# Patient Record
Sex: FEMALE | Race: WHITE | NOT HISPANIC OR LATINO | Employment: OTHER | URBAN - METROPOLITAN AREA
[De-identification: names, ages, dates, MRNs, and addresses within clinical notes are randomized per-mention and may not be internally consistent; named-entity substitution may affect disease eponyms.]

---

## 2017-11-21 ENCOUNTER — OFFICE VISIT (OUTPATIENT)
Dept: UROGYNECOLOGY | Facility: CLINIC | Age: 72
End: 2017-11-21
Payer: MEDICARE

## 2017-11-21 VITALS
BODY MASS INDEX: 29.57 KG/M2 | DIASTOLIC BLOOD PRESSURE: 79 MMHG | WEIGHT: 166.88 LBS | HEIGHT: 63 IN | SYSTOLIC BLOOD PRESSURE: 145 MMHG | HEART RATE: 63 BPM | TEMPERATURE: 98 F

## 2017-11-21 DIAGNOSIS — K43.9 HERNIA OF ANTERIOR ABDOMINAL WALL: ICD-10-CM

## 2017-11-21 DIAGNOSIS — K46.9 ENTEROCELE: Primary | ICD-10-CM

## 2017-11-21 DIAGNOSIS — N81.9 VAGINAL VAULT PROLAPSE: ICD-10-CM

## 2017-11-21 PROCEDURE — 99213 OFFICE O/P EST LOW 20 MIN: CPT | Mod: S$GLB,,, | Performed by: OBSTETRICS & GYNECOLOGY

## 2017-11-21 PROCEDURE — 99999 PR PBB SHADOW E&M-EST. PATIENT-LVL III: CPT | Mod: PBBFAC,,, | Performed by: OBSTETRICS & GYNECOLOGY

## 2017-11-21 NOTE — PROGRESS NOTES
Subjective:     Chief Complaint: enterocele  History of Present Illness: Yue Chatman is a 72 y.o. female who presents for general bulge.  In 2013 she had uphold implant and trans-obturator sling.  In 2015 we saw her with a small anterior enterocele.  She was lost to follow-up until now.  She returns wondering if anything can be done to correct her hernia.  She also has a large abdominal hernia and has had multiple abdominal procedures.  She says when she had her last hernia repair she had to return to surgery and had problems with return of her bowel function.  She still has some abdominal bulge and abdominal pain.  She is not sexually active because of her husbands age and illnesses    Review of Systems    Constitutional: No acute distress. No weight gain/loss.  Eyes: No vision changes.  ENT: No headaches.   Respiratory: No SOB.  Cardiovascular: CAD  Gastrointestinal:  constipation.   Genitourinary:  calculi  Integument/Breast: Negative  Hematologic/Lymphatic: No history of anemia.  Musculoskeletal:osteoporosis  Neurological: DDD  Behavioral/Psych: No history of depression.   Endocrine: .  Glucose tolerance test abnormal  Allergy/Immune: no recent reactions     Objective:   General Exam:  General appearance: WDNF. NAD.   HEENT: Theodora. EOM's intact.  Neck: Normal thyroid.   Back: No CVA tenderness.  RESP: No SOB.  Breasts: deferred  Abdomen: Large ventral hernia on the right side  Extremities: No edema. No varices.  Lymphatic: noncontributory  Skin: No rashes. No lesions.  Neurologic: Intact.   Psych: Oriented.   Pelvic Exam:  V:  No lesions. No palpable nodes.   Va:No d/c bleeding or lesions.  Anterior enterocele which protrudes to a +3 position.  It reduces easily with a cotton tip applicator /on reduction appears to be good posterior and posterior apical support   .Meatus:No caruncle or stenosis  Urethra: Non tender. No suburethral masses.  Good suburethral support / sling is not palpable  Cx/Cuff: Normal    Uterus: Surgically absent.  Ad: No mass or tenderness.  Levators :Symmetrical. Normal tone. Non tender.  BL: Non tender  RV: No hemorrhoids.  Assessment:   Anterior enterocele which has enlarged over time-she has good apical and posterior support.  Her abdominal wall hernia make her a poor candidate for abdominal sacral colpopexy       Plan:    She would best be a candidate for enterocele repair and partial colpocleisis-she is to decide if she wants to undergo surgical therapy-the interim she will see Dr. Castle to follow-up with her abdominal hernia

## 2018-02-26 ENCOUNTER — OFFICE VISIT (OUTPATIENT)
Dept: UROGYNECOLOGY | Facility: CLINIC | Age: 73
End: 2018-02-26
Payer: MEDICARE

## 2018-02-26 VITALS
SYSTOLIC BLOOD PRESSURE: 131 MMHG | HEIGHT: 63 IN | BODY MASS INDEX: 29.23 KG/M2 | DIASTOLIC BLOOD PRESSURE: 71 MMHG | WEIGHT: 165 LBS | TEMPERATURE: 98 F | HEART RATE: 60 BPM

## 2018-02-26 DIAGNOSIS — N81.11 CYSTOCELE, MIDLINE: ICD-10-CM

## 2018-02-26 DIAGNOSIS — K46.9 ENTEROCELE: Primary | ICD-10-CM

## 2018-02-26 DIAGNOSIS — K43.9 VENTRAL HERNIA WITHOUT OBSTRUCTION OR GANGRENE: ICD-10-CM

## 2018-02-26 LAB
BILIRUB SERPL-MCNC: NEGATIVE MG/DL
BLOOD URINE, POC: NEGATIVE
COLOR, POC UA: NORMAL
GLUCOSE UR QL STRIP: NORMAL
KETONES UR QL STRIP: NEGATIVE
LEUKOCYTE ESTERASE URINE, POC: POSITIVE
NITRITE, POC UA: NEGATIVE
PH, POC UA: 6
PROTEIN, POC: NEGATIVE
SPECIFIC GRAVITY, POC UA: 1.02
UROBILINOGEN, POC UA: NORMAL

## 2018-02-26 PROCEDURE — 3008F BODY MASS INDEX DOCD: CPT | Mod: S$GLB,,, | Performed by: OBSTETRICS & GYNECOLOGY

## 2018-02-26 PROCEDURE — 1159F MED LIST DOCD IN RCRD: CPT | Mod: S$GLB,,, | Performed by: OBSTETRICS & GYNECOLOGY

## 2018-02-26 PROCEDURE — 81002 URINALYSIS NONAUTO W/O SCOPE: CPT | Mod: S$GLB,,, | Performed by: OBSTETRICS & GYNECOLOGY

## 2018-02-26 PROCEDURE — 99213 OFFICE O/P EST LOW 20 MIN: CPT | Mod: 25,S$GLB,, | Performed by: OBSTETRICS & GYNECOLOGY

## 2018-02-26 PROCEDURE — 99999 PR PBB SHADOW E&M-EST. PATIENT-LVL IV: CPT | Mod: PBBFAC,,, | Performed by: OBSTETRICS & GYNECOLOGY

## 2018-02-26 PROCEDURE — 1126F AMNT PAIN NOTED NONE PRSNT: CPT | Mod: S$GLB,,, | Performed by: OBSTETRICS & GYNECOLOGY

## 2018-02-26 RX ORDER — AMLODIPINE BESYLATE 5 MG/1
5 TABLET ORAL NIGHTLY
COMMUNITY
Start: 2018-02-19 | End: 2018-10-09 | Stop reason: SDUPTHER

## 2018-02-26 NOTE — PROGRESS NOTES
Subjective:     Chief Complaint: hernia  History of Present Illness: Yue Chatman is a 73 y.o. female who presents for enterocele.  Previously underwent apical suspension and trans-obturator sling.  She was found to have an anterior enterocele.  She has a large ventral hernia and was to see Dr. Castle prior to making a decision on repairing this enterocele.  She has not seen him yet.  She thinks the bulge is worse particularly when she's up on her feet.  She denies any problems with urinary incontinence.  She is not sexually active because of her age and her 's health condition.  She has no plans for future sexual activity.  She is bothered by the bulge and wants something done about it.  no recent UTIs hematuria or dysuria.  She does have a distant history of nephrolithiasis.  her health is good.  Some years ago she had an angiogram which she said which was normal.    Review of Systems    Constitutional: Glucose tolerance impaired  Eyes: No vision changes.  ENT: No headaches.   Respiratory: Nonsmoker  Cardiovascular:  Hypertension  Gastrointestinal:  Ventral hernia  Genitourinary: Kidney stones  Integument/Breast: Negative  Hematologic/Lymphatic: Vitamin D deficiency  Musculoskeletal: OA  Neurological: pain radiating to left leg  Behavioral/Psych: No history of depression.   Endocrine: No hormonal replacement.  Allergy/Immune: no recent reactions     Objective:   General Exam:  General appearance: WDNF. NAD.   HEENT: Theodora. EOM's intact.  Neck: Normal thyroid.   Back: No CVA tenderness.  RESP: No SOB.  Breasts: deferred  Abdomen: Nontender ventral hernia  Extremities: No edema. No varices.  Lymphatic: noncontributory  Skin: No rashes. No lesions.  Neurologic: Intact.   Psych: Oriented.   Pelvic Exam:  V:  No lesions. No palpable nodes.   Va:No d/c bleeding or lesions.  Enterocele/ apical bladder prolapse.Ba+3  .Meatus:No caruncle or stenosis  Urethra: Non tender. No suburethral masses.  Cx/Cuff: Normal    Uterus: Surgically absent.  Ad: No mass or tenderness.  Levators :Symmetrical. Normal tone. Non tender. 2/5 contractions  BL: Non tender  RV: No hemorrhoids.  Assessment:   Enterocele and apical bladder prolapse.  She would like surgical therapy and is not sexually active or planning any sexual activity       Plan:    We will schedule her for enterocele repair and partial colpocleisis.  In the interim she will visit with Dr. Castle to see if he feels the ventral hernia takes precedence

## 2018-03-26 ENCOUNTER — HOSPITAL ENCOUNTER (OUTPATIENT)
Dept: PREADMISSION TESTING | Facility: HOSPITAL | Age: 73
Discharge: HOME | End: 2018-03-26
Attending: OBSTETRICS & GYNECOLOGY
Payer: MEDICARE

## 2018-03-26 ENCOUNTER — OFFICE VISIT (OUTPATIENT)
Dept: UROGYNECOLOGY | Facility: CLINIC | Age: 73
End: 2018-03-26
Payer: MEDICARE

## 2018-03-26 VITALS
DIASTOLIC BLOOD PRESSURE: 72 MMHG | HEART RATE: 61 BPM | SYSTOLIC BLOOD PRESSURE: 138 MMHG | TEMPERATURE: 99 F | BODY MASS INDEX: 29.21 KG/M2 | HEIGHT: 63 IN | WEIGHT: 164.88 LBS

## 2018-03-26 VITALS — HEIGHT: 63 IN | BODY MASS INDEX: 29.06 KG/M2 | WEIGHT: 164 LBS

## 2018-03-26 DIAGNOSIS — K46.9 ENTEROCELE: ICD-10-CM

## 2018-03-26 DIAGNOSIS — R35.0 URINARY FREQUENCY: Primary | ICD-10-CM

## 2018-03-26 DIAGNOSIS — K46.9 ENTEROCELE: Primary | ICD-10-CM

## 2018-03-26 DIAGNOSIS — N81.89 PELVIC RELAXATION: ICD-10-CM

## 2018-03-26 LAB
ANION GAP SERPL CALC-SCNC: 7 MMOL/L
BACTERIA #/AREA URNS HPF: NORMAL /HPF
BASOPHILS # BLD AUTO: 0 K/UL
BASOPHILS NFR BLD: 0.6 %
BILIRUB SERPL-MCNC: NORMAL MG/DL
BILIRUB UR QL STRIP: NEGATIVE
BLOOD URINE, POC: NORMAL
BUN SERPL-MCNC: 11 MG/DL
CALCIUM SERPL-MCNC: 10.2 MG/DL
CHLORIDE SERPL-SCNC: 105 MMOL/L
CLARITY UR: CLEAR
CO2 SERPL-SCNC: 30 MMOL/L
COLOR UR: YELLOW
COLOR, POC UA: YELLOW
CREAT SERPL-MCNC: 0.8 MG/DL
DIFFERENTIAL METHOD: NORMAL
EOSINOPHIL # BLD AUTO: 0.1 K/UL
EOSINOPHIL NFR BLD: 1.8 %
ERYTHROCYTE [DISTWIDTH] IN BLOOD BY AUTOMATED COUNT: 14.1 %
EST. GFR  (AFRICAN AMERICAN): >60 ML/MIN/1.73 M^2
EST. GFR  (NON AFRICAN AMERICAN): >60 ML/MIN/1.73 M^2
GLUCOSE SERPL-MCNC: 100 MG/DL
GLUCOSE UR QL STRIP: NEGATIVE
GLUCOSE UR QL STRIP: NORMAL
HCT VFR BLD AUTO: 43 %
HGB BLD-MCNC: 14.3 G/DL
HGB UR QL STRIP: NEGATIVE
KETONES UR QL STRIP: NEGATIVE
KETONES UR QL STRIP: NORMAL
LEUKOCYTE ESTERASE UR QL STRIP: ABNORMAL
LEUKOCYTE ESTERASE URINE, POC: NORMAL
LYMPHOCYTES # BLD AUTO: 2.4 K/UL
LYMPHOCYTES NFR BLD: 35.2 %
MCH RBC QN AUTO: 28.3 PG
MCHC RBC AUTO-ENTMCNC: 33.3 G/DL
MCV RBC AUTO: 85 FL
MICROSCOPIC COMMENT: NORMAL
MONOCYTES # BLD AUTO: 0.5 K/UL
MONOCYTES NFR BLD: 7.7 %
NEUTROPHILS # BLD AUTO: 3.8 K/UL
NEUTROPHILS NFR BLD: 54.7 %
NITRITE UR QL STRIP: NEGATIVE
NITRITE, POC UA: NORMAL
PH UR STRIP: 5 [PH] (ref 5–8)
PH, POC UA: 6
PLATELET # BLD AUTO: 163 K/UL
PMV BLD AUTO: 11.2 FL
POTASSIUM SERPL-SCNC: 4.1 MMOL/L
PROT UR QL STRIP: NEGATIVE
PROTEIN, POC: NORMAL
RBC # BLD AUTO: 5.06 M/UL
RBC #/AREA URNS HPF: 1 /HPF (ref 0–4)
SODIUM SERPL-SCNC: 142 MMOL/L
SP GR UR STRIP: 1.01 (ref 1–1.03)
SPECIFIC GRAVITY, POC UA: 1
SQUAMOUS #/AREA URNS HPF: 3 /HPF
URN SPEC COLLECT METH UR: ABNORMAL
UROBILINOGEN UR STRIP-ACNC: NEGATIVE EU/DL
UROBILINOGEN, POC UA: NORMAL
WBC # BLD AUTO: 6.9 K/UL
WBC #/AREA URNS HPF: 5 /HPF (ref 0–5)

## 2018-03-26 PROCEDURE — 3078F DIAST BP <80 MM HG: CPT | Mod: CPTII,S$GLB,, | Performed by: OBSTETRICS & GYNECOLOGY

## 2018-03-26 PROCEDURE — 85025 COMPLETE CBC W/AUTO DIFF WBC: CPT

## 2018-03-26 PROCEDURE — 3075F SYST BP GE 130 - 139MM HG: CPT | Mod: CPTII,S$GLB,, | Performed by: OBSTETRICS & GYNECOLOGY

## 2018-03-26 PROCEDURE — 81002 URINALYSIS NONAUTO W/O SCOPE: CPT | Mod: S$GLB,,, | Performed by: OBSTETRICS & GYNECOLOGY

## 2018-03-26 PROCEDURE — 99213 OFFICE O/P EST LOW 20 MIN: CPT | Mod: 25,S$GLB,, | Performed by: OBSTETRICS & GYNECOLOGY

## 2018-03-26 PROCEDURE — 36415 COLL VENOUS BLD VENIPUNCTURE: CPT

## 2018-03-26 PROCEDURE — 99900103 DSU ONLY-NO CHARGE-INITIAL HR (STAT)

## 2018-03-26 PROCEDURE — 81000 URINALYSIS NONAUTO W/SCOPE: CPT

## 2018-03-26 PROCEDURE — 80048 BASIC METABOLIC PNL TOTAL CA: CPT

## 2018-03-26 PROCEDURE — 99999 PR PBB SHADOW E&M-EST. PATIENT-LVL III: CPT | Mod: PBBFAC,,, | Performed by: OBSTETRICS & GYNECOLOGY

## 2018-03-26 PROCEDURE — 93010 ELECTROCARDIOGRAM REPORT: CPT | Mod: ,,, | Performed by: INTERNAL MEDICINE

## 2018-03-26 PROCEDURE — 93005 ELECTROCARDIOGRAM TRACING: CPT

## 2018-03-26 RX ORDER — VIT C/E/ZN/COPPR/LUTEIN/ZEAXAN 250MG-90MG
2000 CAPSULE ORAL DAILY
COMMUNITY
End: 2018-05-04

## 2018-03-26 NOTE — PROGRESS NOTES
Subjective:     Chief Complaint: Enterocele  History of Present Illness: Yue Chatman is a 73 y.o. female who presents for enterocele.  She previously underwent trans-obturator sling and uphold procedure but developed an anterior enterocele.  She is bothered by the bulge and it's worse when she's up on her feet a lot.  She is not have any incontinence.  She is not sexually active and has no plans for future sexual activity.  She has a large ventral hernia and saw Dr. Castle said it does not need to be addressed.  She had previous angiogram in the past which was normal.  She has lab work which was essentially normal including a hemoglobin A1c of 5.4    Review of Systems    Constitutional: Abdominal pain  Eyes: No vision changes.  ENT: No headaches.   Respiratory: No SOB.  Cardiovascular: No chest pain. No edema.  Gastrointestinal: No constipation. No diarrhea. No fecal incontinence.   Genitourinary: No vaginal bleeding or discharge.  Integument/Breast: Negative  Hematologic/Lymphatic: No history of anemia.  Musculoskeletal: Osteoporosis  Neurological: No disc problems. No paresthesias.  Behavioral/Psych: No history of depression.   Endocrine: Recent hemoglobin A1c was 5.4  Allergy/Immune: no recent reactions     Objective:   General Exam:  General appearance: WDNF. NAD.   HEENT: Theodora. EOM's intact.  Neck: Normal thyroid.   Back: No CVA tenderness.  RESP: No SOB.  Breasts: deferred  Abdomen: Ventral hernia  Extremities: No edema. No varices.  Lymphatic: noncontributory  Skin: No rashes. No lesions.  Neurologic: Intact.   Psych: Oriented.   Pelvic Exam:  V:  No lesions. No palpable nodes.   Va:No d/c bleeding or lesions.  Anterior enterocele with good apical support  .Meatus:No caruncle or stenosis  Urethra: Non tender. No suburethral masses.  Suburethral sling is not palpable  Cx/Cuff: Normal   Uterus: Surgically absent.  Ad: No mass or tenderness.  Levators :Symmetrical. Normal tone. Non tender.  BL: Non  tender  RV: No hemorrhoids.  Assessment:   Anterior enterocele.  She is not sexually active with no future plans so we will likely do partialcolpocleisis       Plan:    Enterocele with partial colpocleisis

## 2018-04-04 ENCOUNTER — PATIENT MESSAGE (OUTPATIENT)
Dept: SURGERY | Facility: HOSPITAL | Age: 73
End: 2018-04-04

## 2018-04-04 ENCOUNTER — TELEPHONE (OUTPATIENT)
Dept: UROGYNECOLOGY | Facility: CLINIC | Age: 73
End: 2018-04-04

## 2018-04-04 ENCOUNTER — ANESTHESIA EVENT (OUTPATIENT)
Dept: SURGERY | Facility: HOSPITAL | Age: 73
End: 2018-04-04

## 2018-04-04 NOTE — TELEPHONE ENCOUNTER
----- Message from Solitario Casper sent at 4/4/2018  1:16 PM CDT -----  Contact: patient  Patient called regarding upcoming surgery. Call back at [phone]

## 2018-04-04 NOTE — TELEPHONE ENCOUNTER
As per our conversation, spoke to pt and she is heading to [facility] in a lot of pain , sent Jennifer at pre-op a message

## 2018-04-05 ENCOUNTER — ANESTHESIA (OUTPATIENT)
Dept: SURGERY | Facility: HOSPITAL | Age: 73
End: 2018-04-05

## 2018-04-06 PROBLEM — K43.0 INCARCERATED INCISIONAL HERNIA: Status: ACTIVE | Noted: 2018-04-06

## 2018-04-06 PROBLEM — R10.31 RIGHT LOWER QUADRANT PAIN: Status: ACTIVE | Noted: 2018-04-06

## 2018-05-01 ENCOUNTER — PATIENT MESSAGE (OUTPATIENT)
Dept: UROGYNECOLOGY | Facility: CLINIC | Age: 73
End: 2018-05-01

## 2019-02-05 ENCOUNTER — OFFICE VISIT (OUTPATIENT)
Dept: UROGYNECOLOGY | Facility: CLINIC | Age: 74
End: 2019-02-05
Payer: MEDICARE

## 2019-02-05 VITALS
SYSTOLIC BLOOD PRESSURE: 136 MMHG | DIASTOLIC BLOOD PRESSURE: 75 MMHG | HEIGHT: 63 IN | BODY MASS INDEX: 31.84 KG/M2 | HEART RATE: 67 BPM | WEIGHT: 179.69 LBS

## 2019-02-05 DIAGNOSIS — N81.6 CYSTOCELE WITH RECTOCELE: ICD-10-CM

## 2019-02-05 DIAGNOSIS — N81.10 CYSTOCELE WITH RECTOCELE: ICD-10-CM

## 2019-02-05 DIAGNOSIS — K46.9 ENTEROCELE: Primary | ICD-10-CM

## 2019-02-05 DIAGNOSIS — N81.9 VAGINAL VAULT PROLAPSE: ICD-10-CM

## 2019-02-05 DIAGNOSIS — R35.0 URINARY FREQUENCY: ICD-10-CM

## 2019-02-05 LAB
BILIRUB SERPL-MCNC: ABNORMAL MG/DL
BLOOD URINE, POC: ABNORMAL
COLOR, POC UA: ABNORMAL
GLUCOSE UR QL STRIP: ABNORMAL
KETONES UR QL STRIP: ABNORMAL
LEUKOCYTE ESTERASE URINE, POC: ABNORMAL
NITRITE, POC UA: ABNORMAL
PH, POC UA: 5
PROTEIN, POC: ABNORMAL
SPECIFIC GRAVITY, POC UA: 1.02
UROBILINOGEN, POC UA: ABNORMAL

## 2019-02-05 PROCEDURE — 81002 POCT URINE DIPSTICK WITHOUT MICROSCOPE: ICD-10-PCS | Mod: S$GLB,,, | Performed by: OBSTETRICS & GYNECOLOGY

## 2019-02-05 PROCEDURE — 99999 PR PBB SHADOW E&M-EST. PATIENT-LVL IV: ICD-10-PCS | Mod: PBBFAC,,, | Performed by: OBSTETRICS & GYNECOLOGY

## 2019-02-05 PROCEDURE — 3078F PR MOST RECENT DIASTOLIC BLOOD PRESSURE < 80 MM HG: ICD-10-PCS | Mod: CPTII,S$GLB,, | Performed by: OBSTETRICS & GYNECOLOGY

## 2019-02-05 PROCEDURE — 3075F SYST BP GE 130 - 139MM HG: CPT | Mod: CPTII,S$GLB,, | Performed by: OBSTETRICS & GYNECOLOGY

## 2019-02-05 PROCEDURE — 1101F PR PT FALLS ASSESS DOC 0-1 FALLS W/OUT INJ PAST YR: ICD-10-PCS | Mod: CPTII,S$GLB,, | Performed by: OBSTETRICS & GYNECOLOGY

## 2019-02-05 PROCEDURE — 99213 OFFICE O/P EST LOW 20 MIN: CPT | Mod: 25,S$GLB,, | Performed by: OBSTETRICS & GYNECOLOGY

## 2019-02-05 PROCEDURE — 1101F PT FALLS ASSESS-DOCD LE1/YR: CPT | Mod: CPTII,S$GLB,, | Performed by: OBSTETRICS & GYNECOLOGY

## 2019-02-05 PROCEDURE — 81002 URINALYSIS NONAUTO W/O SCOPE: CPT | Mod: S$GLB,,, | Performed by: OBSTETRICS & GYNECOLOGY

## 2019-02-05 PROCEDURE — 3078F DIAST BP <80 MM HG: CPT | Mod: CPTII,S$GLB,, | Performed by: OBSTETRICS & GYNECOLOGY

## 2019-02-05 PROCEDURE — 99213 PR OFFICE/OUTPT VISIT, EST, LEVL III, 20-29 MIN: ICD-10-PCS | Mod: 25,S$GLB,, | Performed by: OBSTETRICS & GYNECOLOGY

## 2019-02-05 PROCEDURE — 3075F PR MOST RECENT SYSTOLIC BLOOD PRESS GE 130-139MM HG: ICD-10-PCS | Mod: CPTII,S$GLB,, | Performed by: OBSTETRICS & GYNECOLOGY

## 2019-02-05 PROCEDURE — 99999 PR PBB SHADOW E&M-EST. PATIENT-LVL IV: CPT | Mod: PBBFAC,,, | Performed by: OBSTETRICS & GYNECOLOGY

## 2019-02-05 NOTE — PROGRESS NOTES
Subjective:     Chief Complaint:  Vaginal prolapse  History of Present Illness: Yue Chatman is a 74 y.o. female who presents for pelvic relaxation.  In 2013 she underwent apical suspension with U[employer] and transobturator sling.  She was seen later an anterior enterocele and last year was scheduled for surgical repair however the day before she had emergency surgery for an abdominal hernia.  She reported to the emergency room and she was evaluated for a kidney stone but ultimately it turned out to be the hernia.  She has done well since that repair but she is bothered again by her vaginal prolapse and thinks it is worsening.  She has been told she is prediabetic but is not on any medications and her most recent hemoglobin A1c is 5.4.  She is not sexually active primarily because of her 's agent ability    Review of Systems    Constitutional: No acute distress. No weight gain/loss.  Eyes: No vision changes.  ENT: No headaches.   Respiratory: No SOB.  Cardiovascular:  Negative cardiac angiogram Hypertension  Gastrointestinal:  Multiple hernias  Genitourinary:  Nephrolithiasis  Integument/Breast: Negative  Hematologic/Lymphatic: No history of anemia.  Musculoskeletal:  Arthritis  Neurological: No knowndisc problems. No paresthesias.  Behavioral/Psych: No history of depression.   Endocrine:  Borderline glucose intolerance  Allergy/Immune: no recent reactions     Objective:   General Exam:  General appearance: WDNF. NAD.   HEENT: Theodora. EOM's intact.  Neck: Normal thyroid.   Back: No CVA tenderness.  RESP: No SOB.  Breasts: deferred  Abdomen:  Midline incision healing well.  Fascia feels intact. Large panniculus  Extremities:  Trace edema. No varices.  Lymphatic: noncontributory  Skin: No rashes. No lesions.  Neurologic:  Grossly intact   Psych: Oriented.   Pelvic Exam:  V:  No lesions. No palpable nodes.   Va:No d/c bleeding or lesions.  Central prolapse with palpable enterocele separate from bladder. No  bowel is palpated in hernia sac.  Posterior apical support is still pretty good and no implant is palpable and there is no exposure or erosion    .Meatus:No caruncle or stenosis  Urethra: Non tender. No suburethral masses.  UVJ seems to be fairly well supported despite the massive prolapse  Cx/Cuff: Normal   Uterus: Surgically absent.  Ad: No mass or tenderness.  Levators :Symmetrical. Normal tone. Non tender.  Minimal contraction  BL: Non tender  RV: No hemorrhoids.  Assessment:   Anterior enterocele which is causing traction cystocele and vault prolapse.  She did not do well with the pessary in the past.  She is prone to hernias with multiple abdominal procedures for hernias       Plan:    Enterocele  repair with partial colpocleisis

## 2019-02-28 ENCOUNTER — TELEPHONE (OUTPATIENT)
Dept: UROGYNECOLOGY | Facility: CLINIC | Age: 74
End: 2019-02-28

## 2019-02-28 NOTE — TELEPHONE ENCOUNTER
----- Message from Marleny Deleon sent at 2/28/2019  8:25 AM CST -----  Type: Needs Medical Advice    Who Called:  self  Symptoms (please be specific):  Requesting to cancel procedure and post op  How long has patient had these symptoms:  03/11 and 03/14 th   Pharmacy name and phone #:     Best Call Back Number: [phone] (home)     Additional Information: states she will call to reschedule

## 2019-02-28 NOTE — TELEPHONE ENCOUNTER
Called and spoke to pt and she states that she wants to postpone the surgery for now , did not give any indication when she wants to reschedule. Canceled  The pre op appt, called surgery and informed.

## 2019-07-15 ENCOUNTER — OFFICE VISIT (OUTPATIENT)
Dept: UROGYNECOLOGY | Facility: CLINIC | Age: 74
End: 2019-07-15
Payer: MEDICARE

## 2019-07-15 VITALS
HEART RATE: 73 BPM | BODY MASS INDEX: 32.15 KG/M2 | HEIGHT: 63 IN | DIASTOLIC BLOOD PRESSURE: 82 MMHG | SYSTOLIC BLOOD PRESSURE: 158 MMHG | WEIGHT: 181.44 LBS

## 2019-07-15 DIAGNOSIS — R35.0 URINARY FREQUENCY: ICD-10-CM

## 2019-07-15 DIAGNOSIS — N81.9 VAGINAL VAULT PROLAPSE: Primary | ICD-10-CM

## 2019-07-15 DIAGNOSIS — K46.9 ENTEROCELE: ICD-10-CM

## 2019-07-15 DIAGNOSIS — N89.8 OTHER SPECIFIED NONINFLAMMATORY DISORDERS OF VAGINA: ICD-10-CM

## 2019-07-15 DIAGNOSIS — N95.2 ATROPHIC VAGINITIS: ICD-10-CM

## 2019-07-15 DIAGNOSIS — E31.9 POLYGLANDULAR DYSFUNCTION: ICD-10-CM

## 2019-07-15 LAB
BILIRUB SERPL-MCNC: ABNORMAL MG/DL
BLOOD URINE, POC: ABNORMAL
COLOR, POC UA: YELLOW
GLUCOSE UR QL STRIP: ABNORMAL
KETONES UR QL STRIP: ABNORMAL
LEUKOCYTE ESTERASE URINE, POC: ABNORMAL
NITRITE, POC UA: ABNORMAL
PH, POC UA: 5
PROTEIN, POC: ABNORMAL
SPECIFIC GRAVITY, POC UA: 1.02
UROBILINOGEN, POC UA: ABNORMAL

## 2019-07-15 PROCEDURE — 99213 OFFICE O/P EST LOW 20 MIN: CPT | Mod: 25,S$GLB,, | Performed by: OBSTETRICS & GYNECOLOGY

## 2019-07-15 PROCEDURE — 99213 PR OFFICE/OUTPT VISIT, EST, LEVL III, 20-29 MIN: ICD-10-PCS | Mod: 25,S$GLB,, | Performed by: OBSTETRICS & GYNECOLOGY

## 2019-07-15 PROCEDURE — 3079F PR MOST RECENT DIASTOLIC BLOOD PRESSURE 80-89 MM HG: ICD-10-PCS | Mod: CPTII,S$GLB,, | Performed by: OBSTETRICS & GYNECOLOGY

## 2019-07-15 PROCEDURE — 3077F PR MOST RECENT SYSTOLIC BLOOD PRESSURE >= 140 MM HG: ICD-10-PCS | Mod: CPTII,S$GLB,, | Performed by: OBSTETRICS & GYNECOLOGY

## 2019-07-15 PROCEDURE — 1101F PR PT FALLS ASSESS DOC 0-1 FALLS W/OUT INJ PAST YR: ICD-10-PCS | Mod: CPTII,S$GLB,, | Performed by: OBSTETRICS & GYNECOLOGY

## 2019-07-15 PROCEDURE — 3079F DIAST BP 80-89 MM HG: CPT | Mod: CPTII,S$GLB,, | Performed by: OBSTETRICS & GYNECOLOGY

## 2019-07-15 PROCEDURE — 81002 URINALYSIS NONAUTO W/O SCOPE: CPT | Mod: S$GLB,,, | Performed by: OBSTETRICS & GYNECOLOGY

## 2019-07-15 PROCEDURE — 3077F SYST BP >= 140 MM HG: CPT | Mod: CPTII,S$GLB,, | Performed by: OBSTETRICS & GYNECOLOGY

## 2019-07-15 PROCEDURE — 99999 PR PBB SHADOW E&M-EST. PATIENT-LVL III: ICD-10-PCS | Mod: PBBFAC,,, | Performed by: OBSTETRICS & GYNECOLOGY

## 2019-07-15 PROCEDURE — 81002 POCT URINE DIPSTICK WITHOUT MICROSCOPE: ICD-10-PCS | Mod: S$GLB,,, | Performed by: OBSTETRICS & GYNECOLOGY

## 2019-07-15 PROCEDURE — 1101F PT FALLS ASSESS-DOCD LE1/YR: CPT | Mod: CPTII,S$GLB,, | Performed by: OBSTETRICS & GYNECOLOGY

## 2019-07-15 PROCEDURE — 99999 PR PBB SHADOW E&M-EST. PATIENT-LVL III: CPT | Mod: PBBFAC,,, | Performed by: OBSTETRICS & GYNECOLOGY

## 2019-07-15 NOTE — PROGRESS NOTES
Subjective:     Chief Complaint:  Pelvic relaxation  History of Present Illness: Yue Chatman is a 74 y.o. female who presents for  enterocele.  She had previously been scheduled for colpocleisis but had emergency hernia surgery.  We rescheduled recently but unfortunately her grandson  so her surgery was canceled again.  She is quite stressed by this event but she would like to get her surgical repair done because the prolapse is worsening and she is concerned about her health deteriorating the future and no longer being a surgical candidate.  She is not sexually active and has no plans for future sexual activity.  We discussed partial colpocleisis to give additional strength and shorten the procedure in she understands that this might require another surgical procedure if she should become sexually active again    Review of Systems    Constitutional: No acute distress. No weight gain/loss.  Eyes: No vision changes.  ENT: No headaches.   Respiratory: No SOB.  CardiovascularCAD  Gastrointestinal: No constipation. No diarrhea. No fecal incontinence.   Genitourinary:  Renal calculi  Integument/Breast: Negative  Hematologic/Lymphatic:  Vitamin-D deficiency  Musculoskeletal: OA  Neurological: No major disc problems. No paresthesias.  Behavioral/Psych: No history of depression.   Endocrine:  Abnormal glucose tolerance  Allergy/Immune: no recent reactions     Objective:   General Exam:  General appearance: WDNF. NAD.   HEENT: Theodora. EOM's intact.  Neck: Normal thyroid.   Back: No CVA tenderness.  RESP: No SOB.  Breasts: deferred  Abdomen:  Surgical repair well-healed but there is a significant panniculus  Extremities: No edema. No varices.  Lymphatic: noncontributory  Skin: No rashes. No lesions.  Neurologic: Intact.   Psych: Oriented.   Pelvic Exam:  V:  No lesions. No palpable nodes.   Va:No d/c bleeding or lesions.  Atrophy.  Loss of rugae.  Almost complete vault prolapse now to a +4 position. There is still some  differential posterior support but everything is rotating around the implant  .Meatus:No caruncle or stenosis  Urethra: Non tender. No suburethral masses.  Cx/Cuff: Normal   Uterus: Surgically absent.  Ad: No mass or tenderness.  Levators :Symmetrical. Normal tone. Non tender.  BL: Non tender  RV: No hemorrhoids.  Assessment:   Significant worsening of her prolapse       Plan:    Enterocele repair with partial colpocleisis

## 2019-07-25 ENCOUNTER — TELEPHONE (OUTPATIENT)
Dept: UROGYNECOLOGY | Facility: CLINIC | Age: 74
End: 2019-07-25

## 2019-07-25 NOTE — TELEPHONE ENCOUNTER
----- Message from Ivania Hein sent at 7/25/2019  8:43 AM CDT -----  Type: Needs Medical Advice    Who Called: patient     Best Call Back Number: [phone]   Additional Information: pt called in to cancel the surgery on 8/22 stated that she feels like she could do without it - please contact pt directly and advise

## 2019-07-25 NOTE — TELEPHONE ENCOUNTER
Called and spoke to patient and she states she knows that she had canceled the surgery twice , but does not want to go thru this is she does not have to at her age. Advised the patient that I will leave in the computer till Dr Keenan comes back in town, if she changes her mind it will still be on the books, but when he returns I will let him know her decision. Advised to call back if she changes her mind. States understanding and call was ended.

## 2019-08-01 NOTE — TELEPHONE ENCOUNTER
"Called and informed patient per Dr Keenan "Tell her that is understandable. It is not a life threatening condition and she should only go through surgery if she is completely comfortable doing it. ".  States understanding and call was ended .Called surgery and canceled surgery.  "

## 2020-01-10 PROBLEM — E31.9 POLYGLANDULAR DYSFUNCTION: Status: ACTIVE | Noted: 2020-01-10

## 2020-11-15 ENCOUNTER — OFFICE VISIT (OUTPATIENT)
Dept: FAMILY MEDICINE | Facility: CLINIC | Age: 75
End: 2020-11-15
Payer: MEDICARE

## 2020-11-15 VITALS
SYSTOLIC BLOOD PRESSURE: 122 MMHG | TEMPERATURE: 98 F | OXYGEN SATURATION: 97 % | BODY MASS INDEX: 33.61 KG/M2 | HEIGHT: 63 IN | WEIGHT: 189.69 LBS | DIASTOLIC BLOOD PRESSURE: 76 MMHG | HEART RATE: 77 BPM

## 2020-11-15 DIAGNOSIS — L03.032 CELLULITIS OF LEFT TOE: Primary | ICD-10-CM

## 2020-11-15 PROCEDURE — 1159F PR MEDICATION LIST DOCUMENTED IN MEDICAL RECORD: ICD-10-PCS | Mod: S$GLB,,, | Performed by: NURSE PRACTITIONER

## 2020-11-15 PROCEDURE — 3078F DIAST BP <80 MM HG: CPT | Mod: CPTII,S$GLB,, | Performed by: NURSE PRACTITIONER

## 2020-11-15 PROCEDURE — 3288F FALL RISK ASSESSMENT DOCD: CPT | Mod: CPTII,S$GLB,, | Performed by: NURSE PRACTITIONER

## 2020-11-15 PROCEDURE — 99203 OFFICE O/P NEW LOW 30 MIN: CPT | Mod: S$GLB,,, | Performed by: NURSE PRACTITIONER

## 2020-11-15 PROCEDURE — 1101F PR PT FALLS ASSESS DOC 0-1 FALLS W/OUT INJ PAST YR: ICD-10-PCS | Mod: CPTII,S$GLB,, | Performed by: NURSE PRACTITIONER

## 2020-11-15 PROCEDURE — 3078F PR MOST RECENT DIASTOLIC BLOOD PRESSURE < 80 MM HG: ICD-10-PCS | Mod: CPTII,S$GLB,, | Performed by: NURSE PRACTITIONER

## 2020-11-15 PROCEDURE — 3074F SYST BP LT 130 MM HG: CPT | Mod: CPTII,S$GLB,, | Performed by: NURSE PRACTITIONER

## 2020-11-15 PROCEDURE — 1125F PR PAIN SEVERITY QUANTIFIED, PAIN PRESENT: ICD-10-PCS | Mod: S$GLB,,, | Performed by: NURSE PRACTITIONER

## 2020-11-15 PROCEDURE — 1159F MED LIST DOCD IN RCRD: CPT | Mod: S$GLB,,, | Performed by: NURSE PRACTITIONER

## 2020-11-15 PROCEDURE — 99203 PR OFFICE/OUTPT VISIT, NEW, LEVL III, 30-44 MIN: ICD-10-PCS | Mod: S$GLB,,, | Performed by: NURSE PRACTITIONER

## 2020-11-15 PROCEDURE — 3074F PR MOST RECENT SYSTOLIC BLOOD PRESSURE < 130 MM HG: ICD-10-PCS | Mod: CPTII,S$GLB,, | Performed by: NURSE PRACTITIONER

## 2020-11-15 PROCEDURE — 1125F AMNT PAIN NOTED PAIN PRSNT: CPT | Mod: S$GLB,,, | Performed by: NURSE PRACTITIONER

## 2020-11-15 PROCEDURE — 3288F PR FALLS RISK ASSESSMENT DOCUMENTED: ICD-10-PCS | Mod: CPTII,S$GLB,, | Performed by: NURSE PRACTITIONER

## 2020-11-15 PROCEDURE — 1101F PT FALLS ASSESS-DOCD LE1/YR: CPT | Mod: CPTII,S$GLB,, | Performed by: NURSE PRACTITIONER

## 2020-11-15 RX ORDER — CLINDAMYCIN HYDROCHLORIDE 150 MG/1
150 CAPSULE ORAL 3 TIMES DAILY
Qty: 30 CAPSULE | Refills: 0 | Status: SHIPPED | OUTPATIENT
Start: 2020-11-15 | End: 2020-11-25

## 2020-11-15 RX ORDER — CLINDAMYCIN HYDROCHLORIDE 300 MG/1
300 CAPSULE ORAL 3 TIMES DAILY
Qty: 21 CAPSULE | Refills: 0 | Status: SHIPPED | OUTPATIENT
Start: 2020-11-15 | End: 2020-11-25

## 2020-11-15 NOTE — PROGRESS NOTES
Yue Chatman is a 75 y.o. female patient of Jose Montgomery MD who presents to the clinic today for   Chief Complaint   Patient presents with    Toe Pain   .    HPI    Pt, who is not known to me, reports a new problem to me:  Concern for infection where a callous was removed 11/13/2020 by Dr. Lara, dermatologist.  She has a f/u appt with him 12/3/2020. .    These symptoms began 2 days ago and status is worsening--now painful, red and swollen..     Pt denies the following symptoms:  Fever, feelin clement.    Aggravating factors include touching the area, having the foot down. .    Relieving factors include elevating does help some. .    OTC Medications tried are none.    Prescription medications taken for symptoms are bactroban.    Pertinent medical history:  Recent callous removal ont he affected toe    ROS    Constitutional:  No fever, not feeling ill.    Skin:  See chief complaint/HPI.    HEENT:  No complaints.    Heart/Lungs:  No complaints    GI/:  No sxs.    MS:  No new problems in bones, joints or muscles.      Past Medical History:   Diagnosis Date    Acquired female bladder prolapse     Borderline diabetes     Cataract     Coronary artery disease 2009    NEGATIVE CARDIAC ANGIOGRAM PER PATIENT    Glucose intolerance (impaired glucose tolerance)     Hypertension     Nephrolithiasis     Obesity     Osteoporosis     Wears partial dentures     UPPERS       Current Outpatient Medications:     amLODIPine (NORVASC) 5 MG tablet, TAKE 1 TABLET BY MOUTH EVERY DAY, Disp: 90 tablet, Rfl: 1    blood sugar diagnostic (TRUE METRIX GLUCOSE TEST STRIP) Strp, TEST TWICE A DAY, Disp: 200 strip, Rfl: 2    ergocalciferol (ERGOCALCIFEROL) 50,000 unit Cap, TAKE 1 CAPSULE BY MOUTH ONE TIME PER WEEK, Disp: 12 capsule, Rfl: 1    lancets (ONETOUCH DELICA LANCETS) 33 gauge Misc, 1 lancet by Misc.(Non-Drug; Combo Route) route 2 (two) times a day., Disp: 100 each, Rfl: 3    metFORMIN (GLUCOPHAGE) 500 MG tablet,  "Take 0.5 tablets (250 mg total) by mouth 2 (two) times daily with meals., Disp: 90 tablet, Rfl: 1    pravastatin (PRAVACHOL) 40 MG tablet, TAKE 1 TABLET (40 MG TOTAL) BY MOUTH ONCE DAILY., Disp: 90 tablet, Rfl: 1    ramipriL (ALTACE) 10 MG capsule, TAKE 1 CAPSULE BY MOUTH EVERY DAY, Disp: 90 capsule, Rfl: 1    TRUE METRIX GLUCOSE METER Misc, USE AS DIRECTED, Disp: 2 each, Rfl: 0    Pt is not a smoker.    PHYSICAL EXAM    Alert, coop 75 y.o. female patient in no acute distress, is not ill-appearing.    Vitals:    11/15/20 1221   BP: 122/76   Pulse: 77   Temp: 98.3 °F (36.8 °C)   SpO2: 97%   Weight: 86 kg (189 lb 11.3 oz)   Height: 5' 3" (1.6 m)     VS reviewed.  VS stable.  CC, nursing note, medications & PMH all reviewed today.    Head:  Normocephalic, atraumatic.    EENT:  Ext nose/ears without lesions or erythema..               Eye lid without erythema or lesions, conjunctivae not injected.     Resp:  Respirations even, unlabored    Heart:  Regular rate.  BP controlled.  CV:  Cap RF brisk    MS:  Ambulates independently, steady gait but antalgic.     NEURO:  Alert and oriented x 4.  Responds appropriately during interaction.                  Sensation to light touch intact over the affected toe/other toes and areas of the left foot. .    Skin:  Warm, dry, color good.            A/an  erythematous, edematous left 3rd toe with a small scab is noted immediately, tender to touch..          Plantar surface under the base of the left 3rd toe also mildly edematous and tender to touch.        Psych:  Responds appropriately throughout the visit.               Relaxed.  Well-groomed        Pt today presents with   Chief Complaint   Patient presents with    Toe Pain   Redness and swelling of the left 3rd toe, which is very sore, scabbed lesion where a callous was removed.  Additionally, no fever or feeling of illnss    Physical exam shows key findings of red and swollen toe (see above) .    Findings consistent with " cellulitis of the left 3rd toe .    This is a new problem to me.  No work up is planned.        Patient's allergies/medical conditions/anti-coagulant therapy significant for cephalexin and azithromycin, limiting prescribing choices. .    Pt advised to perform comfort measures recommended on patient instruction sheet, which were reviewed at the time of the visit..    If not better in 2 days, the patient is advised to call here, PCP office or go for an in-person/follow up evaluation.  If worse or concerns, the patient is advised to go to the nearest ER.  Explained exam findings, diagnosis and treatment plan to patient and her .  Questions answered and patient states understanding.

## 2020-11-15 NOTE — PATIENT INSTRUCTIONS
Cellulitis  Cellulitis is an infection of the deep layers of skin. A break in the skin, such as a cut or scratch, can let bacteria under the skin. If the bacteria get to deep layers of the skin, it can be serious. If not treated, cellulitis can get into the bloodstream and lymph nodes. The infection can then spread throughout the body. This causes serious illness.  Cellulitis causes the affected skin to become red, swollen, warm, and sore. The reddened areas have a visible border. An open sore may leak fluid (pus). You may have a fever, chills, and pain.  Cellulitis is treated with antibiotics taken for 7 to 10 days. An open sore may be cleaned and covered with cool wet gauze. Symptoms should get better 1 to 2 days after treatment is started. Make sure to take all the antibiotics for the full number of days until they are gone. Keep taking the medicine even if your symptoms go away.  Home care  Follow these tips:  · Limit the use of the part of your body with cellulitis.   · If the infection is on your leg, keep your leg raised while sitting. This will help to reduce swelling.  · Take all of the antibiotic medicine exactly as directed until it is gone. Do not miss any doses, especially during the first 7 days. Dont stop taking the medicine when your symptoms get better.  · Keep the affected area clean and dry.  · Wash your hands with soap and warm water before and after touching your skin. Anyone else who touches your skin should also wash his or her hands. Don't share towels.  Follow-up care  Follow up with your healthcare provider, or as advised. If your infection does not go away on the first antibiotic, your healthcare provider will prescribe a different one.  When to seek medical advice  Call your healthcare provider right away if any of these occur:  · Red areas that spread  · Swelling or pain that gets worse  · Fluid leaking from the skin (pus)  · Fever higher of 100.4º F (38.0º C) or higher after 2 days  on antibiotics  Date Last Reviewed: 9/1/2016  © 5075-3505 [employer] [address] [place] [address], [place] [address]. All rights reserved. This information is not intended as a substitute for professional medical care. Always follow your healthcare professional's instructions.    Recheck Wednesday.  Call in the morning to let us know when you'd like to come in.  If worse at any time or you get fever or start to feel ill, go to the ER.    Elevate.  Probiotic twice a day.  Thank you for using the [facility]!    FARIDEH Dominique, CNP, FNP-BC  [facility]    To rate your experience with SKYLA Dominique, click on the link below:      [email or web address]

## 2020-11-18 ENCOUNTER — OFFICE VISIT (OUTPATIENT)
Dept: FAMILY MEDICINE | Facility: CLINIC | Age: 75
End: 2020-11-18
Payer: MEDICARE

## 2020-11-18 VITALS
SYSTOLIC BLOOD PRESSURE: 130 MMHG | BODY MASS INDEX: 33.72 KG/M2 | TEMPERATURE: 98 F | OXYGEN SATURATION: 97 % | WEIGHT: 190.38 LBS | DIASTOLIC BLOOD PRESSURE: 80 MMHG | HEART RATE: 69 BPM

## 2020-11-18 DIAGNOSIS — L03.032 CELLULITIS OF TOE OF LEFT FOOT: Primary | ICD-10-CM

## 2020-11-18 PROCEDURE — 3079F PR MOST RECENT DIASTOLIC BLOOD PRESSURE 80-89 MM HG: ICD-10-PCS | Mod: CPTII,S$GLB,, | Performed by: NURSE PRACTITIONER

## 2020-11-18 PROCEDURE — 3079F DIAST BP 80-89 MM HG: CPT | Mod: CPTII,S$GLB,, | Performed by: NURSE PRACTITIONER

## 2020-11-18 PROCEDURE — 1101F PT FALLS ASSESS-DOCD LE1/YR: CPT | Mod: CPTII,S$GLB,, | Performed by: NURSE PRACTITIONER

## 2020-11-18 PROCEDURE — 1101F PR PT FALLS ASSESS DOC 0-1 FALLS W/OUT INJ PAST YR: ICD-10-PCS | Mod: CPTII,S$GLB,, | Performed by: NURSE PRACTITIONER

## 2020-11-18 PROCEDURE — 1125F PR PAIN SEVERITY QUANTIFIED, PAIN PRESENT: ICD-10-PCS | Mod: S$GLB,,, | Performed by: NURSE PRACTITIONER

## 2020-11-18 PROCEDURE — 99212 PR OFFICE/OUTPT VISIT, EST, LEVL II, 10-19 MIN: ICD-10-PCS | Mod: S$GLB,,, | Performed by: NURSE PRACTITIONER

## 2020-11-18 PROCEDURE — 1125F AMNT PAIN NOTED PAIN PRSNT: CPT | Mod: S$GLB,,, | Performed by: NURSE PRACTITIONER

## 2020-11-18 PROCEDURE — 99212 OFFICE O/P EST SF 10 MIN: CPT | Mod: S$GLB,,, | Performed by: NURSE PRACTITIONER

## 2020-11-18 PROCEDURE — 3288F PR FALLS RISK ASSESSMENT DOCUMENTED: ICD-10-PCS | Mod: CPTII,S$GLB,, | Performed by: NURSE PRACTITIONER

## 2020-11-18 PROCEDURE — 3075F SYST BP GE 130 - 139MM HG: CPT | Mod: CPTII,S$GLB,, | Performed by: NURSE PRACTITIONER

## 2020-11-18 PROCEDURE — 3075F PR MOST RECENT SYSTOLIC BLOOD PRESS GE 130-139MM HG: ICD-10-PCS | Mod: CPTII,S$GLB,, | Performed by: NURSE PRACTITIONER

## 2020-11-18 PROCEDURE — 3288F FALL RISK ASSESSMENT DOCD: CPT | Mod: CPTII,S$GLB,, | Performed by: NURSE PRACTITIONER

## 2020-11-18 PROCEDURE — 1159F PR MEDICATION LIST DOCUMENTED IN MEDICAL RECORD: ICD-10-PCS | Mod: S$GLB,,, | Performed by: NURSE PRACTITIONER

## 2020-11-18 PROCEDURE — 1159F MED LIST DOCD IN RCRD: CPT | Mod: S$GLB,,, | Performed by: NURSE PRACTITIONER

## 2020-11-18 NOTE — PATIENT INSTRUCTIONS
Continue the antiobiotic.  Take it with a cracker or 2 or at least 1 hour after eating.    Increase water.    Elevate the toe.    Keep loosely covered until it heals over.    Send a picture through [facility] tomorrow afternoon, please, by:  Taking a picture on the cell phone and save it.  ON THE SAME CELL PHONE, open in My Chart.  Under Messaging choose ASK A Question  Then Communicate with a Provider.  Choose Matilde Florian  Nonurgent Medical  Put whatever message you want.  At the bottom there's a green button.  Press it to attach up to 3 photos.  SEND.    Again, if it gets worse or you get fever or start to feel ill, go to the ER.    Madhuri Florian, FARIDEH, CNP, FNP-BC  [facility]

## 2020-11-18 NOTE — Clinical Note
Jose Montgomery MD,  I saw your patient today in [facility]. If you have any questions, please do not hesitate to contact me.  Thank you!  SKYLA Dominique, [facility]

## 2020-11-18 NOTE — PROGRESS NOTES
"  Yue Chatman is a 75 y.o. female patient of Jose Montgomery MD who presents to the clinic today for   Chief Complaint   Patient presents with    Follow-up     f/u left toe   .    HPI    Pt, who is known to me, reports a continuing problem to me:  Left 3rd toe pain and swelling that has slightly decreased in pain, no able to walk with only slight pain .    These symptoms began 5 days ago and status is improved.     Pt denies the following symptoms:  Fever, body aches, feeling ill    Aggravating factors include recent removal of a growth from her toe 1 week ago .    Relieving factors include clindamycin  .    OTC Medications tried are none.    Prescription medications taken for symptoms are clindamycin.    Pertinent medical history:  Recent removal of a growth on her toe that "had roots".    ROS    Constitutional:  No fever.    Skin:  See chief complaint/HPI.    HEENT:  No complaints.    Heart/Lungs:  No complaints    GI/:  No sxs.    MS:  No new problems in bones, joints or muscles.      Past Medical History:   Diagnosis Date    Acquired female bladder prolapse     Borderline diabetes     Cataract     Coronary artery disease 2009    NEGATIVE CARDIAC ANGIOGRAM PER PATIENT    Glucose intolerance (impaired glucose tolerance)     Hypertension     Nephrolithiasis     Obesity     Osteoporosis     Wears partial dentures     UPPERS       Current Outpatient Medications:     amLODIPine (NORVASC) 5 MG tablet, TAKE 1 TABLET BY MOUTH EVERY DAY, Disp: 90 tablet, Rfl: 1    blood sugar diagnostic (TRUE METRIX GLUCOSE TEST STRIP) Strp, TEST TWICE A DAY, Disp: 200 strip, Rfl: 2    clindamycin (CLEOCIN) 150 MG capsule, Take 1 capsule (150 mg total) by mouth 3 (three) times daily. for 10 days, Disp: 30 capsule, Rfl: 0    clindamycin (CLEOCIN) 300 MG capsule, Take 1 capsule (300 mg total) by mouth 3 (three) times daily. for 10 days, Disp: 21 capsule, Rfl: 0    ergocalciferol (ERGOCALCIFEROL) 50,000 unit Cap, " TAKE 1 CAPSULE BY MOUTH ONE TIME PER WEEK, Disp: 12 capsule, Rfl: 1    lancets (ONETOUCH DELICA LANCETS) 33 gauge Misc, 1 lancet by Misc.(Non-Drug; Combo Route) route 2 (two) times a day., Disp: 100 each, Rfl: 3    metFORMIN (GLUCOPHAGE) 500 MG tablet, Take 0.5 tablets (250 mg total) by mouth 2 (two) times daily with meals., Disp: 90 tablet, Rfl: 1    pravastatin (PRAVACHOL) 40 MG tablet, TAKE 1 TABLET (40 MG TOTAL) BY MOUTH ONCE DAILY., Disp: 90 tablet, Rfl: 1    ramipriL (ALTACE) 10 MG capsule, TAKE 1 CAPSULE BY MOUTH EVERY DAY, Disp: 90 capsule, Rfl: 1    TRUE METRIX GLUCOSE METER Misc, USE AS DIRECTED, Disp: 2 each, Rfl: 0    Pt is not a smoker.    PHYSICAL EXAM    Alert, coop 75 y.o. female patient in no acute distress, is not ill-appearing.    Vitals:    11/18/20 1047   BP: 130/80   Pulse: 69   Temp: 98.4 °F (36.9 °C)   TempSrc: Oral   SpO2: 97%   Weight: 86.3 kg (190 lb 5.5 oz)     VS reviewed.  VS stable.  CC, nursing note, medications & PMH all reviewed today.    Head:  Normocephalic, atraumatic.    EENT:  Ext nose/ears without lesions or erythema..               Eye lid without erythema or lesions, conjunctivae not injected.     Resp:  Respirations even, unlabored      Heart:  RRRR, no murmur.  CV:  Cap RF brisk.      MS:  Ambulates independently, steady gait.      NEURO:  Alert and oriented x 4.  Responds appropriately during interaction.                  Sensation to light touch intact over the affected toe and foot area .    Skin:  Warm, dry, color good.           A crusted lesion, approx 1 x 1 cm, located on the left 3rd toe which is tender to palp.  No pustule(s), no vesicle(s) noted.  There is a/an erythematous base and toe.  The lesions not fluctuant.          There is granulation tissue developing, no d/c present.  The line of redness has receded some and the pain is less today.  Gait is more comfortable, barely limping.            Psych:  Responds appropriately throughout the visit.                Relaxed.  Well-groomed    Cellulitis of toe of left foot  Comments:  slightly improved      Pt today presents with   Chief Complaint   Patient presents with    Follow-up     f/u left toe   .She has been taking the clindamycin with her meals and yogurt.   She is tolerating it well.  The toe appears somewhat better and she is much more comfortable walking. .    Physical exam shows key findings of Granulation tissue developing at the site of the lesion removal,  Some recession of the line of redness, more normal gait and non-ill-appearing patient.    Findings consistent with cellulitis left 3rd toe improving .    This is a new problem to me.  No work up is planned.      Pt advised to perform comfort measures recommended on patient instruction sheet, which were reviewed at the time of the visit.  She is also advised to take the medication with a small amount of food-maybe a cracker, or about 1 hr after eating.    She will ask her daughter to upload a picture tomorrow night after work.  Again, if it worsens, she has fever or feels ill, she should go to the Er.    Explained exam findings, diagnosis and treatment plan to patient.  Questions answered and patient states understanding.

## 2020-11-19 ENCOUNTER — PATIENT MESSAGE (OUTPATIENT)
Dept: FAMILY MEDICINE | Facility: CLINIC | Age: 75
End: 2020-11-19

## 2020-11-19 DIAGNOSIS — L03.032 CELLULITIS OF TOE OF LEFT FOOT: Primary | ICD-10-CM

## 2020-11-19 RX ORDER — DICLOXACILLIN SODIUM 500 MG/1
500 CAPSULE ORAL 4 TIMES DAILY
Qty: 40 CAPSULE | Refills: 0 | Status: SHIPPED | OUTPATIENT
Start: 2020-11-19 | End: 2020-11-29

## 2020-11-20 ENCOUNTER — PATIENT MESSAGE (OUTPATIENT)
Dept: FAMILY MEDICINE | Facility: CLINIC | Age: 75
End: 2020-11-20

## 2020-11-20 DIAGNOSIS — S90.425D: Primary | ICD-10-CM

## 2020-11-24 ENCOUNTER — PATIENT MESSAGE (OUTPATIENT)
Dept: FAMILY MEDICINE | Facility: CLINIC | Age: 75
End: 2020-11-24

## 2020-11-25 ENCOUNTER — OFFICE VISIT (OUTPATIENT)
Dept: FAMILY MEDICINE | Facility: CLINIC | Age: 75
End: 2020-11-25
Payer: MEDICARE

## 2020-11-25 DIAGNOSIS — L03.032 CELLULITIS OF TOE, LEFT: Primary | ICD-10-CM

## 2020-11-25 PROCEDURE — 99212 PR OFFICE/OUTPT VISIT, EST, LEVL II, 10-19 MIN: ICD-10-PCS | Mod: S$GLB,,, | Performed by: NURSE PRACTITIONER

## 2020-11-25 PROCEDURE — 99212 OFFICE O/P EST SF 10 MIN: CPT | Mod: S$GLB,,, | Performed by: NURSE PRACTITIONER

## 2020-11-25 PROCEDURE — 1159F PR MEDICATION LIST DOCUMENTED IN MEDICAL RECORD: ICD-10-PCS | Mod: S$GLB,,, | Performed by: NURSE PRACTITIONER

## 2020-11-25 PROCEDURE — 1159F MED LIST DOCD IN RCRD: CPT | Mod: S$GLB,,, | Performed by: NURSE PRACTITIONER

## 2020-11-25 NOTE — TELEPHONE ENCOUNTER
Pt's toe began to improve as of 11/23/2020.  No ID appointment needed at this time. Xray also cancelled.

## 2020-11-25 NOTE — PROGRESS NOTES
Mrs. Chatman comes back today for me to look at her toe.  It started having a little more pain again.  She's been taking the dicloxacillin for 4 days.  It seemed like it was improving quite a bit 2 days ago, but the pain is back today.  She is allergic to cephalosporins (hand swelling with cephalexin at discharge after a bout of pneumonia in the hospital, where she received many different antibiotics).  I have advised her that she needs to go to the ER for probable IV AB treatment.    In looking at the toe, it looks like there has been little to no progress since the photo taken 11/20/2020.  The distal phalynx is still edematous.  The entire toe is still erythematous.  She continues to limp slightly, although improved from the original visit 10 days ago.  She is afebrile    The xray taken at [facility] showed no bony abnormality.    Failing to have resolution after 2 weeks on either clindamycin or dicloxacillin and the patient allergic to cephalosporins and erythromycin,  I have advised her to go to the ER for further care.  She chooses to go to [facility], due to its closeness to her home..  She may not go until tomorrow afternoon, in case she has to stay overnight for treatment.    Madhuri GONG, FERNP-BC [facility]

## 2021-01-22 ENCOUNTER — PATIENT MESSAGE (OUTPATIENT)
Dept: ADMINISTRATIVE | Facility: OTHER | Age: 76
End: 2021-01-22

## 2022-02-24 ENCOUNTER — TELEPHONE (OUTPATIENT)
Dept: UROGYNECOLOGY | Facility: CLINIC | Age: 77
End: 2022-02-24
Payer: MEDICARE

## 2022-02-24 NOTE — TELEPHONE ENCOUNTER
----- Message from Corin Dejesus sent at 2/24/2022  1:42 PM CST -----  Regarding: sooner appt request  Contact: GARIMA FULLER [[id number]]  Type:  Sooner Appointment Request    Caller is requesting a sooner appointment.    Caller declined first available appointment listed below.    Caller is requesting a message be sent to doctor.    Name of Caller: GARIMA FULLER [[id number]]  When is the first available appointment? 3/28/2022  Symptoms: vag pain  Would the patient rather a call back or a response via [facility]? Call back   Best Call Back Number: [phone]     Additional Information: preferred appt time around 10:00 am

## 2022-03-17 ENCOUNTER — LAB VISIT (OUTPATIENT)
Dept: FAMILY MEDICINE | Facility: CLINIC | Age: 77
End: 2022-03-17
Payer: MEDICARE

## 2022-03-17 DIAGNOSIS — I10 ESSENTIAL HYPERTENSION: Primary | ICD-10-CM

## 2022-03-17 DIAGNOSIS — R73.02 GLUCOSE INTOLERANCE (IMPAIRED GLUCOSE TOLERANCE): ICD-10-CM

## 2022-03-17 PROCEDURE — 83036 HEMOGLOBIN GLYCOSYLATED A1C: CPT | Performed by: NURSE PRACTITIONER

## 2022-03-17 PROCEDURE — 80053 COMPREHEN METABOLIC PANEL: CPT | Performed by: NURSE PRACTITIONER

## 2022-03-17 PROCEDURE — 85027 COMPLETE CBC AUTOMATED: CPT | Performed by: NURSE PRACTITIONER

## 2022-03-17 PROCEDURE — 82043 UR ALBUMIN QUANTITATIVE: CPT | Performed by: NURSE PRACTITIONER

## 2022-03-17 PROCEDURE — 82570 ASSAY OF URINE CREATININE: CPT | Performed by: NURSE PRACTITIONER

## 2022-03-17 PROCEDURE — 85007 BL SMEAR W/DIFF WBC COUNT: CPT | Performed by: NURSE PRACTITIONER

## 2022-03-17 NOTE — PROGRESS NOTES
Venipuncture performed with 21 gauge butterfly, x's 1 attempt.  Successful venipuncture to R Antecubital vein.  Specimens collected per orders.      Pressure dressing applied to site, instructed patient to remove dressing in 10-15 minutes, OK to re-adjust dressing if pressure causing any discomfort, to observe closely for numbness and/or discoloration to hand or fingers, and to notify provider if bleeding persists after applying constant pressure lasting 30 minutes.

## 2022-03-18 LAB
ALBUMIN SERPL BCP-MCNC: 4.1 G/DL (ref 3.5–5.2)
ALBUMIN/CREAT UR: NORMAL UG/MG (ref 0–30)
ALP SERPL-CCNC: 55 U/L (ref 55–135)
ALT SERPL W/O P-5'-P-CCNC: 16 U/L (ref 10–44)
ANION GAP SERPL CALC-SCNC: 10 MMOL/L (ref 8–16)
AST SERPL-CCNC: 16 U/L (ref 10–40)
BASOPHILS # BLD AUTO: ABNORMAL K/UL (ref 0–0.2)
BASOPHILS NFR BLD: 1 % (ref 0–1.9)
BILIRUB SERPL-MCNC: 0.5 MG/DL (ref 0.1–1)
BUN SERPL-MCNC: 16 MG/DL (ref 8–23)
CALCIUM SERPL-MCNC: 9.8 MG/DL (ref 8.7–10.5)
CHLORIDE SERPL-SCNC: 102 MMOL/L (ref 95–110)
CO2 SERPL-SCNC: 29 MMOL/L (ref 23–29)
CREAT SERPL-MCNC: 0.8 MG/DL (ref 0.5–1.4)
CREAT UR-MCNC: 33 MG/DL (ref 15–325)
DIFFERENTIAL METHOD: ABNORMAL
EOSINOPHIL # BLD AUTO: ABNORMAL K/UL (ref 0–0.5)
EOSINOPHIL NFR BLD: 4 % (ref 0–8)
ERYTHROCYTE [DISTWIDTH] IN BLOOD BY AUTOMATED COUNT: 13.5 % (ref 11.5–14.5)
EST. GFR  (AFRICAN AMERICAN): >60 ML/MIN/1.73 M^2
EST. GFR  (NON AFRICAN AMERICAN): >60 ML/MIN/1.73 M^2
ESTIMATED AVG GLUCOSE: 114 MG/DL (ref 68–131)
GLUCOSE SERPL-MCNC: 96 MG/DL (ref 70–110)
HBA1C MFR BLD: 5.6 % (ref 4–5.6)
HCT VFR BLD AUTO: 45.9 % (ref 37–48.5)
HGB BLD-MCNC: 14.2 G/DL (ref 12–16)
IMM GRANULOCYTES # BLD AUTO: ABNORMAL K/UL (ref 0–0.04)
IMM GRANULOCYTES NFR BLD AUTO: ABNORMAL % (ref 0–0.5)
LYMPHOCYTES # BLD AUTO: ABNORMAL K/UL (ref 1–4.8)
LYMPHOCYTES NFR BLD: 38 % (ref 18–48)
MCH RBC QN AUTO: 28.2 PG (ref 27–31)
MCHC RBC AUTO-ENTMCNC: 30.9 G/DL (ref 32–36)
MCV RBC AUTO: 91 FL (ref 82–98)
METAMYELOCYTES NFR BLD MANUAL: 2 %
MICROALBUMIN UR DL<=1MG/L-MCNC: <5 UG/ML
MONOCYTES # BLD AUTO: ABNORMAL K/UL (ref 0.3–1)
MONOCYTES NFR BLD: 12 % (ref 4–15)
NEUTROPHILS # BLD AUTO: ABNORMAL K/UL (ref 1.8–7.7)
NEUTROPHILS NFR BLD: 39 % (ref 38–73)
NEUTS BAND NFR BLD MANUAL: 4 %
NRBC BLD-RTO: 0 /100 WBC
PLATELET # BLD AUTO: 158 K/UL (ref 150–450)
PMV BLD AUTO: 13.5 FL (ref 9.2–12.9)
POTASSIUM SERPL-SCNC: 4.2 MMOL/L (ref 3.5–5.1)
PROT SERPL-MCNC: 7 G/DL (ref 6–8.4)
RBC # BLD AUTO: 5.04 M/UL (ref 4–5.4)
SODIUM SERPL-SCNC: 141 MMOL/L (ref 136–145)
WBC # BLD AUTO: 6.48 K/UL (ref 3.9–12.7)

## 2022-04-11 ENCOUNTER — TELEPHONE (OUTPATIENT)
Dept: UROGYNECOLOGY | Facility: CLINIC | Age: 77
End: 2022-04-11
Payer: MEDICARE

## 2022-04-11 NOTE — TELEPHONE ENCOUNTER
----- Message from Madelin Ford sent at 4/11/2022  8:43 AM CDT -----  Caller is requesting a sooner appointment.  Caller declined first available appointment listed below.  Caller will not accept being placed on the waitlist and is requesting a message be sent to doctor.    Name of Caller:GARIMA FULLER     When is the first available appointment? 5/10/22    Symptoms: Vag pain    Best Call Back Number: [phone]    Additional Information: Pt went to ER yesterday; needs to be seen as soon as possible.

## 2022-04-11 NOTE — TELEPHONE ENCOUNTER
Spoke to patient and informed that Dr kay is not here this week will see Dr rahman on wednesday at 1100

## 2022-04-13 ENCOUNTER — PATIENT MESSAGE (OUTPATIENT)
Dept: UROGYNECOLOGY | Facility: CLINIC | Age: 77
End: 2022-04-13

## 2022-04-13 ENCOUNTER — OFFICE VISIT (OUTPATIENT)
Dept: UROGYNECOLOGY | Facility: CLINIC | Age: 77
End: 2022-04-13
Payer: MEDICARE

## 2022-04-13 VITALS
SYSTOLIC BLOOD PRESSURE: 136 MMHG | HEART RATE: 67 BPM | BODY MASS INDEX: 30.12 KG/M2 | DIASTOLIC BLOOD PRESSURE: 73 MMHG | HEIGHT: 63 IN | WEIGHT: 170 LBS

## 2022-04-13 DIAGNOSIS — N99.3 PROLAPSE OF VAGINAL VAULT AFTER HYSTERECTOMY: Primary | ICD-10-CM

## 2022-04-13 PROCEDURE — 1126F PR PAIN SEVERITY QUANTIFIED, NO PAIN PRESENT: ICD-10-PCS | Mod: CPTII,S$GLB,, | Performed by: OBSTETRICS & GYNECOLOGY

## 2022-04-13 PROCEDURE — 51701 INSERT BLADDER CATHETER: CPT | Mod: S$GLB,,, | Performed by: OBSTETRICS & GYNECOLOGY

## 2022-04-13 PROCEDURE — 99999 PR PBB SHADOW E&M-EST. PATIENT-LVL III: CPT | Mod: PBBFAC,,, | Performed by: OBSTETRICS & GYNECOLOGY

## 2022-04-13 PROCEDURE — 1159F PR MEDICATION LIST DOCUMENTED IN MEDICAL RECORD: ICD-10-PCS | Mod: CPTII,S$GLB,, | Performed by: OBSTETRICS & GYNECOLOGY

## 2022-04-13 PROCEDURE — 3075F PR MOST RECENT SYSTOLIC BLOOD PRESS GE 130-139MM HG: ICD-10-PCS | Mod: CPTII,S$GLB,, | Performed by: OBSTETRICS & GYNECOLOGY

## 2022-04-13 PROCEDURE — 3078F DIAST BP <80 MM HG: CPT | Mod: CPTII,S$GLB,, | Performed by: OBSTETRICS & GYNECOLOGY

## 2022-04-13 PROCEDURE — 1101F PT FALLS ASSESS-DOCD LE1/YR: CPT | Mod: CPTII,S$GLB,, | Performed by: OBSTETRICS & GYNECOLOGY

## 2022-04-13 PROCEDURE — 3288F FALL RISK ASSESSMENT DOCD: CPT | Mod: CPTII,S$GLB,, | Performed by: OBSTETRICS & GYNECOLOGY

## 2022-04-13 PROCEDURE — 99214 PR OFFICE/OUTPT VISIT, EST, LEVL IV, 30-39 MIN: ICD-10-PCS | Mod: 25,S$GLB,, | Performed by: OBSTETRICS & GYNECOLOGY

## 2022-04-13 PROCEDURE — 51701 PR INSERTION OF NON-INDWELLING BLADDER CATHETERIZATION FOR RESIDUAL UR: ICD-10-PCS | Mod: S$GLB,,, | Performed by: OBSTETRICS & GYNECOLOGY

## 2022-04-13 PROCEDURE — 99999 PR PBB SHADOW E&M-EST. PATIENT-LVL III: ICD-10-PCS | Mod: PBBFAC,,, | Performed by: OBSTETRICS & GYNECOLOGY

## 2022-04-13 PROCEDURE — 1159F MED LIST DOCD IN RCRD: CPT | Mod: CPTII,S$GLB,, | Performed by: OBSTETRICS & GYNECOLOGY

## 2022-04-13 PROCEDURE — 3078F PR MOST RECENT DIASTOLIC BLOOD PRESSURE < 80 MM HG: ICD-10-PCS | Mod: CPTII,S$GLB,, | Performed by: OBSTETRICS & GYNECOLOGY

## 2022-04-13 PROCEDURE — 99214 OFFICE O/P EST MOD 30 MIN: CPT | Mod: 25,S$GLB,, | Performed by: OBSTETRICS & GYNECOLOGY

## 2022-04-13 PROCEDURE — 3288F PR FALLS RISK ASSESSMENT DOCUMENTED: ICD-10-PCS | Mod: CPTII,S$GLB,, | Performed by: OBSTETRICS & GYNECOLOGY

## 2022-04-13 PROCEDURE — 1101F PR PT FALLS ASSESS DOC 0-1 FALLS W/OUT INJ PAST YR: ICD-10-PCS | Mod: CPTII,S$GLB,, | Performed by: OBSTETRICS & GYNECOLOGY

## 2022-04-13 PROCEDURE — 3075F SYST BP GE 130 - 139MM HG: CPT | Mod: CPTII,S$GLB,, | Performed by: OBSTETRICS & GYNECOLOGY

## 2022-04-13 PROCEDURE — 1126F AMNT PAIN NOTED NONE PRSNT: CPT | Mod: CPTII,S$GLB,, | Performed by: OBSTETRICS & GYNECOLOGY

## 2022-04-13 NOTE — PROGRESS NOTES
Subjective:      Patient ID: Yue Chatman is a 77 y.o. female.    Chief Complaint:  No chief complaint on file.      History of Present Illness  [age]ara presented to ED secondary vaginal pain.  This is a patient with advanced pelvic organ prolapse that has been in process of getting surgery however secondary to conditions outside her control from emergency hernia repair to the grandson unable to move forward surgeries time patient is looking to address her concerns she is having discomfort would like assistance          Past Medical History:   Diagnosis Date    Acquired female bladder prolapse     Borderline diabetes     Cataract     Coronary artery disease     NEGATIVE CARDIAC ANGIOGRAM PER PATIENT    Glucose intolerance (impaired glucose tolerance)     Hypertension     Nephrolithiasis     Obesity     Osteoporosis     Wears partial dentures     UPPERS       Past Surgical History:   Procedure Laterality Date    APPENDECTOMY      BLADDER SUSPENSION      CATARACT EXTRACTION, BILATERAL      CHOLECYSTECTOMY      COLONOSCOPY  ;     GALLBLADDER SURGERY      HERNIA REPAIR  2012    VENTRAL     HYSTERECTOMY      INCISIONAL HERNIA REPAIR  2018    Dr[facility] ANSELMO Castle, [facility]     renal stone removal         GYN & OB History  No LMP recorded. Patient has had a hysterectomy.   Date of Last Pap: No result found    OB History    Para Term  AB Living   4 4 4     4   SAB IAB Ectopic Multiple Live Births                  # Outcome Date GA Lbr Cameron/2nd Weight Sex Delivery Anes PTL Lv   4 Term            3 Term            2 Term            1 Term                Health Maintenance       Date Due Completion Date    Aspirin/Antiplatelet Therapy 10/09/2020 10/9/2019 (ClinicallyNA)    Override on 10/9/2019: Not Clinically Appropriate    DEXA Scan 2022 (Done)    Override on 2019: Done    TETANUS VACCINE 2024    Lipid Panel  "04/20/2026 4/20/2021          Family History   Problem Relation Age of Onset    Hypertension Mother     Arthritis Sister     Osteoporosis Sister     Thyroid disease Sister     COPD Sister        Social History     Socioeconomic History    Marital status:    Occupational History    Occupation: retired, housewife   Tobacco Use    Smoking status: Never Smoker    Smokeless tobacco: Never Used   Substance and Sexual Activity    Alcohol use: No    Drug use: No       Review of Systems  Review of Systems  At home distortion discomfort     Objective:   /73   Pulse 67   Ht 5' 3" (1.6 m)   Wt 77.1 kg (169 lb 15.6 oz)   BMI 30.11 kg/m²     Physical Exam   Stage for vaginal wall prolapse complete prolapse.  I did reduce without difficulty straight catheterization patient for 180 cc of urine  Assessment:     1. Prolapse of vaginal vault after hysterectomy            Plan:     1. Prolapse of vaginal vault after hysterectomy      Long discussion with patient regarding my findings.  The I do think that the best address is the plan for surgery colpocleisis.  She is scheduled to see Dr. KERWIN love later in May I have asked to move that up and I have asked her to obtain medical clearance so that the surgery can be scheduled in acted upon with in the near future.  Patient very much appreciated the time spent with her today  There are no Patient Instructions on file for this visit.        "

## 2022-04-21 PROBLEM — E66.01 MORBID OBESITY: Status: ACTIVE | Noted: 2022-04-21

## 2022-05-16 ENCOUNTER — LAB VISIT (OUTPATIENT)
Dept: FAMILY MEDICINE | Facility: CLINIC | Age: 77
End: 2022-05-16
Payer: MEDICARE

## 2022-05-16 DIAGNOSIS — R39.9 UTI SYMPTOMS: ICD-10-CM

## 2022-05-16 PROCEDURE — 87086 URINE CULTURE/COLONY COUNT: CPT | Performed by: NURSE PRACTITIONER

## 2022-05-17 ENCOUNTER — OFFICE VISIT (OUTPATIENT)
Dept: UROGYNECOLOGY | Facility: CLINIC | Age: 77
End: 2022-05-17
Payer: MEDICARE

## 2022-05-17 VITALS
HEIGHT: 63 IN | HEART RATE: 68 BPM | DIASTOLIC BLOOD PRESSURE: 67 MMHG | WEIGHT: 171.94 LBS | BODY MASS INDEX: 30.46 KG/M2 | SYSTOLIC BLOOD PRESSURE: 139 MMHG

## 2022-05-17 DIAGNOSIS — R10.2 PELVIC PAIN: ICD-10-CM

## 2022-05-17 DIAGNOSIS — N81.9 VAGINAL VAULT PROLAPSE: Primary | ICD-10-CM

## 2022-05-17 DIAGNOSIS — K46.9 ENTEROCELE: ICD-10-CM

## 2022-05-17 PROCEDURE — 99999 PR PBB SHADOW E&M-EST. PATIENT-LVL IV: ICD-10-PCS | Mod: PBBFAC,,, | Performed by: OBSTETRICS & GYNECOLOGY

## 2022-05-17 PROCEDURE — 3288F PR FALLS RISK ASSESSMENT DOCUMENTED: ICD-10-PCS | Mod: CPTII,S$GLB,, | Performed by: OBSTETRICS & GYNECOLOGY

## 2022-05-17 PROCEDURE — 3078F DIAST BP <80 MM HG: CPT | Mod: CPTII,S$GLB,, | Performed by: OBSTETRICS & GYNECOLOGY

## 2022-05-17 PROCEDURE — 3288F FALL RISK ASSESSMENT DOCD: CPT | Mod: CPTII,S$GLB,, | Performed by: OBSTETRICS & GYNECOLOGY

## 2022-05-17 PROCEDURE — 1101F PT FALLS ASSESS-DOCD LE1/YR: CPT | Mod: CPTII,S$GLB,, | Performed by: OBSTETRICS & GYNECOLOGY

## 2022-05-17 PROCEDURE — 3078F PR MOST RECENT DIASTOLIC BLOOD PRESSURE < 80 MM HG: ICD-10-PCS | Mod: CPTII,S$GLB,, | Performed by: OBSTETRICS & GYNECOLOGY

## 2022-05-17 PROCEDURE — 99214 PR OFFICE/OUTPT VISIT, EST, LEVL IV, 30-39 MIN: ICD-10-PCS | Mod: S$GLB,,, | Performed by: OBSTETRICS & GYNECOLOGY

## 2022-05-17 PROCEDURE — 1101F PR PT FALLS ASSESS DOC 0-1 FALLS W/OUT INJ PAST YR: ICD-10-PCS | Mod: CPTII,S$GLB,, | Performed by: OBSTETRICS & GYNECOLOGY

## 2022-05-17 PROCEDURE — 1126F PR PAIN SEVERITY QUANTIFIED, NO PAIN PRESENT: ICD-10-PCS | Mod: CPTII,S$GLB,, | Performed by: OBSTETRICS & GYNECOLOGY

## 2022-05-17 PROCEDURE — 1159F MED LIST DOCD IN RCRD: CPT | Mod: CPTII,S$GLB,, | Performed by: OBSTETRICS & GYNECOLOGY

## 2022-05-17 PROCEDURE — 3075F SYST BP GE 130 - 139MM HG: CPT | Mod: CPTII,S$GLB,, | Performed by: OBSTETRICS & GYNECOLOGY

## 2022-05-17 PROCEDURE — 3075F PR MOST RECENT SYSTOLIC BLOOD PRESS GE 130-139MM HG: ICD-10-PCS | Mod: CPTII,S$GLB,, | Performed by: OBSTETRICS & GYNECOLOGY

## 2022-05-17 PROCEDURE — 1126F AMNT PAIN NOTED NONE PRSNT: CPT | Mod: CPTII,S$GLB,, | Performed by: OBSTETRICS & GYNECOLOGY

## 2022-05-17 PROCEDURE — 99999 PR PBB SHADOW E&M-EST. PATIENT-LVL IV: CPT | Mod: PBBFAC,,, | Performed by: OBSTETRICS & GYNECOLOGY

## 2022-05-17 PROCEDURE — 99214 OFFICE O/P EST MOD 30 MIN: CPT | Mod: S$GLB,,, | Performed by: OBSTETRICS & GYNECOLOGY

## 2022-05-17 PROCEDURE — 1159F PR MEDICATION LIST DOCUMENTED IN MEDICAL RECORD: ICD-10-PCS | Mod: CPTII,S$GLB,, | Performed by: OBSTETRICS & GYNECOLOGY

## 2022-05-17 RX ORDER — MUPIROCIN 20 MG/G
OINTMENT TOPICAL
Status: CANCELLED | OUTPATIENT
Start: 2022-05-17

## 2022-05-17 RX ORDER — SODIUM CHLORIDE 9 MG/ML
INJECTION, SOLUTION INTRAVENOUS CONTINUOUS
Status: CANCELLED | OUTPATIENT
Start: 2022-05-17

## 2022-05-17 NOTE — PROGRESS NOTES
Subjective:     Chief Complaint:  Prolapse  History of Present Illness: Yue Chatman is a 77 y.o. female who presents for enterocele and vaginal vault prolapse.  She is known to us from a few years ago when she was being prepared for colpocleisis however she could not do it at the time because she had abdominal hernia that needed to be repaired.  She says that was done and she did very well with that surgery.  She saw Dr. Adams recently with vaginal pain and he reduced the prolapse.  She says it is getting harder to get it to go back.  She says she has had 2 recent UTIs and is presently on Cipro.  She recently was admitted because her heart was skipping beats and they thought she would need a pacemaker but she did not    Review of Systems    Constitutional: No acute distress. No weight gain/loss.  Eyes:  Cataracts.  ENT: No headaches.   Respiratory: No SOB.  Cardiovascular: CAD  Gastrointestinal:  Hernia repair   Genitourinary:  Nephrolithiasis  Integument/Breast: Negative  Hematologic/Lymphatic: No history of anemia.  Musculoskeletal:  Osteoporosis  Neurological:  Vertigo  Behavioral/Psych: No history of depression.   Endocrine:  Impaired glucose tolerance  Allergy/Immune: no recent reactions     Objective:   General Exam:  General appearance:  Wearing mask   HEENT: Theodora. EOM's intact.  Neck: Normal thyroid.   Back: No CVA tenderness.  RESP: No SOB.  Breasts: deferred  Abdomen:  Protuberant.  Extremities:  Trace edema.   Lymphatic: noncontributory  Skin: No rashes. No lesions.  Neurologic:  Grossly intact.   Psych: Oriented.   Pelvic Exam:  V:  No lesions. No palpable nodes.  Large hiatus  Va:  Total eversion but there are no ulcerations hyperkeratosis or other lesions noted  .Meatus:No caruncle or stenosis  Urethra: Non tender. No suburethral masses.  Cx/Cuff: Normal   Uterus: Surgically absent.  Ad: No mass or tenderness.  Levators :Symmetrical. Normal tone. Non tender.  BL: Non tender  RV: No  hemorrhoids.  Assessment:   Total vaginal eversion.  Not good candidate for pessary with the large hiatus   Resolving UTI    Plan:      Colpocleisis if she gets clearance from Cardiology

## 2022-05-18 LAB — BACTERIA UR CULT: NO GROWTH

## 2022-06-08 ENCOUNTER — PROCEDURE VISIT (OUTPATIENT)
Dept: UROGYNECOLOGY | Facility: CLINIC | Age: 77
End: 2022-06-08
Payer: MEDICARE

## 2022-06-08 VITALS
HEART RATE: 66 BPM | DIASTOLIC BLOOD PRESSURE: 56 MMHG | SYSTOLIC BLOOD PRESSURE: 112 MMHG | HEIGHT: 63 IN | WEIGHT: 175.06 LBS | RESPIRATION RATE: 18 BRPM | BODY MASS INDEX: 31.02 KG/M2

## 2022-06-08 DIAGNOSIS — R10.2 PELVIC PAIN: ICD-10-CM

## 2022-06-08 DIAGNOSIS — K46.9 ENTEROCELE: ICD-10-CM

## 2022-06-08 DIAGNOSIS — R35.0 URINARY FREQUENCY: Primary | ICD-10-CM

## 2022-06-08 DIAGNOSIS — N81.9 VAGINAL VAULT PROLAPSE: ICD-10-CM

## 2022-06-08 DIAGNOSIS — N30.90 CYSTITIS: ICD-10-CM

## 2022-06-08 LAB
BILIRUB SERPL-MCNC: NEGATIVE MG/DL
BLOOD URINE, POC: 250
CLARITY, POC UA: ABNORMAL
COLOR, POC UA: ABNORMAL
GLUCOSE UR QL STRIP: NEGATIVE
KETONES UR QL STRIP: NEGATIVE
LEUKOCYTE ESTERASE URINE, POC: ABNORMAL
NITRITE, POC UA: POSITIVE
PH, POC UA: 5
PROTEIN, POC: ABNORMAL
SPECIFIC GRAVITY, POC UA: 1.01
UROBILINOGEN, POC UA: NEGATIVE

## 2022-06-08 PROCEDURE — 51701 PR INSERTION OF NON-INDWELLING BLADDER CATHETERIZATION FOR RESIDUAL UR: ICD-10-PCS | Mod: S$GLB,,, | Performed by: NURSE PRACTITIONER

## 2022-06-08 PROCEDURE — 81002 URINALYSIS NONAUTO W/O SCOPE: CPT | Mod: S$GLB,,, | Performed by: NURSE PRACTITIONER

## 2022-06-08 PROCEDURE — 87086 URINE CULTURE/COLONY COUNT: CPT | Performed by: NURSE PRACTITIONER

## 2022-06-08 PROCEDURE — 87088 URINE BACTERIA CULTURE: CPT | Performed by: NURSE PRACTITIONER

## 2022-06-08 PROCEDURE — 51701 INSERT BLADDER CATHETER: CPT | Mod: S$GLB,,, | Performed by: NURSE PRACTITIONER

## 2022-06-08 PROCEDURE — 81002 POCT URINE DIPSTICK WITHOUT MICROSCOPE: ICD-10-PCS | Mod: S$GLB,,, | Performed by: NURSE PRACTITIONER

## 2022-06-08 PROCEDURE — 87186 SC STD MICRODIL/AGAR DIL: CPT | Performed by: NURSE PRACTITIONER

## 2022-06-08 PROCEDURE — 99214 OFFICE O/P EST MOD 30 MIN: CPT | Mod: 25,S$GLB,, | Performed by: NURSE PRACTITIONER

## 2022-06-08 PROCEDURE — 99214 PR OFFICE/OUTPT VISIT, EST, LEVL IV, 30-39 MIN: ICD-10-PCS | Mod: 25,S$GLB,, | Performed by: NURSE PRACTITIONER

## 2022-06-08 PROCEDURE — 87077 CULTURE AEROBIC IDENTIFY: CPT | Performed by: NURSE PRACTITIONER

## 2022-06-08 RX ORDER — ROSUVASTATIN CALCIUM 10 MG/1
TABLET, COATED ORAL
COMMUNITY
End: 2022-10-11

## 2022-06-08 RX ORDER — SULFAMETHOXAZOLE AND TRIMETHOPRIM 800; 160 MG/1; MG/1
1 TABLET ORAL 2 TIMES DAILY
Qty: 10 TABLET | Refills: 0 | Status: SHIPPED | OUTPATIENT
Start: 2022-06-08 | End: 2022-06-13

## 2022-06-08 NOTE — PROCEDURES
Subjective:       Patient ID: Yue Chatman is a 77 y.o. female.    Chief Complaint: CMG      Yue Chatman is a 77 y.o. female who presents today for CMG in preparation of possible surgical intervention with Dr. Keenan.  She last saw Dr. Keenan on 05/17/22 and has not had any real change in her symptoms since this visit.  She is schedule for surgery on 07/07/22.  She denies any acute complaints/concerns and is ready to proceed with the CMG.    Review of Systems   Constitutional: Negative for activity change, fever and unexpected weight change.   HENT: Negative for hearing loss.    Eyes: Negative for visual disturbance.   Respiratory: Negative for shortness of breath and wheezing.    Cardiovascular: Negative for chest pain, palpitations and leg swelling.   Gastrointestinal: Negative for abdominal pain, constipation and diarrhea.   Genitourinary: Positive for difficulty urinating and frequency. Negative for dyspareunia, dysuria, urgency, vaginal bleeding and vaginal discharge.   Musculoskeletal: Negative for gait problem and neck pain.   Skin: Negative for rash and wound.   Allergic/Immunologic: Negative for immunocompromised state.   Neurological: Negative for tremors, speech difficulty and weakness.   Hematological: Does not bruise/bleed easily.   Psychiatric/Behavioral: Negative for agitation and confusion.       Objective:      Physical Exam  Constitutional:       General: She is not in acute distress.     Appearance: She is well-developed.   HENT:      Head: Normocephalic and atraumatic.   Neck:      Thyroid: No thyromegaly.   Pulmonary:      Effort: Pulmonary effort is normal. No respiratory distress.   Abdominal:      Palpations: Abdomen is soft.      Tenderness: There is no abdominal tenderness.      Hernia: No hernia is present.   Musculoskeletal:         General: Normal range of motion.      Cervical back: Neck supple.   Skin:     General: Skin is warm and dry.      Findings: No rash.   Neurological:       Mental Status: She is alert and oriented to person, place, and time.   Psychiatric:         Behavior: Behavior normal.       Pelvic Exam:  V: No lesions. No palpable nodes.   Va: tissue is mildly friable.  Vaginal vault prolapse to +3 postion  Meatus:No caruncle or stenosis  Urethra: Non tender. No suburethral masses.  Cx/Cuff: Normal   Uterus: surgically absent  Ad: No mass or tenderness.  Levators :Symmetrical. Normal tone. Non tender.  BL: Non tender  RV: No hemorrhoids.      Assessment:       1. Urinary frequency    2. Vaginal vault prolapse    3. Enterocele    4. Pelvic pain    5. Cystitis          Procedure Note:   CMG-  Patient was unable to void prior to beginning the CMG.  The catheter was inserted and approx. 160 ML of residual urine noted.  The catheterized urine specimen was dipped and her UA was positive for leukocytes and blood.  Nitrates seemed to be positive as well.  At this point the catheter was withdrawn and the cath urine was sent for culture.  Pt will be given antibiotics  And her CMG will be rescheduled.      Plan:       Urinary frequency- we will send her urine for culture as noted below on the cath specimen  -     Urine culture    Vaginal vault prolapse- we will reschedule her CMG.    Enterocele- as noted above    Pelvic pain- as noted above    Cystitis- we will start her on bactrim pending culture results.  I believe she needs to be on prophylactic antibiotics until we can get the CMG done.  We will base this off the urine culture.    -     sulfamethoxazole-trimethoprim 800-160mg (BACTRIM DS) 800-160 mg Tab; Take 1 tablet by mouth 2 (two) times daily. for 5 days  Dispense: 10 tablet; Refill: 0    RTC 2 weeks.

## 2022-06-11 LAB — BACTERIA UR CULT: ABNORMAL

## 2022-06-14 RX ORDER — NITROFURANTOIN (MACROCRYSTALS) 100 MG/1
100 CAPSULE ORAL EVERY 12 HOURS
Qty: 10 CAPSULE | Refills: 0 | Status: SHIPPED | OUTPATIENT
Start: 2022-06-14 | End: 2022-06-19

## 2022-06-20 ENCOUNTER — TELEPHONE (OUTPATIENT)
Dept: UROGYNECOLOGY | Facility: CLINIC | Age: 77
End: 2022-06-20
Payer: MEDICARE

## 2022-06-20 NOTE — TELEPHONE ENCOUNTER
States she has to have an angiogram and they were going to see if they can do it sooner , blair states that she was going to give her a call back in the morning and let her know if they can do sooner.

## 2022-06-20 NOTE — TELEPHONE ENCOUNTER
----- Message from Shanika Jimenez sent at 6/20/2022  4:07 PM CDT -----  Contact: Milind  Who Called: PT  Regarding: Milind the pt's  called to confirm that all the paperwork needed to proceed with the pt's procedure is in. He is requesting a callback.   Would the patient rather a call back or a response via [facility]? Call back  Best Call Back Number: [phone]  Additional Information:

## 2022-06-21 NOTE — TELEPHONE ENCOUNTER
Spoke to pt and she has to have angiogram July 5th ,  Did not know if you wanted to move surgery and appointments back?

## 2022-06-22 ENCOUNTER — TELEPHONE (OUTPATIENT)
Dept: UROGYNECOLOGY | Facility: CLINIC | Age: 77
End: 2022-06-22
Payer: MEDICARE

## 2022-06-22 NOTE — TELEPHONE ENCOUNTER
----- Message from Sophie Crain sent at 6/22/2022 10:16 AM CDT -----  Contact: pt [phone]  Patient states that she went to Cardiology for clearance and based on what they found they are sending her for a angiogram on 07/05/2022 11am and will follow up on 07/12/2022    All appts on 06/27, 06/28, 06/29 & 07/07 may need to be cancelled until after clearance.    Please call and advise.    Thank You

## 2022-06-22 NOTE — TELEPHONE ENCOUNTER
Spoke to pt and she is having angiogram on the 5th of July and  Is going to follow up with cardiologist on 7/12. So put surgery on hold for now.

## 2022-07-18 ENCOUNTER — OFFICE VISIT (OUTPATIENT)
Dept: FAMILY MEDICINE | Facility: CLINIC | Age: 77
End: 2022-07-18
Payer: MEDICARE

## 2022-07-18 VITALS
OXYGEN SATURATION: 95 % | HEIGHT: 63 IN | BODY MASS INDEX: 31.87 KG/M2 | HEART RATE: 73 BPM | SYSTOLIC BLOOD PRESSURE: 150 MMHG | DIASTOLIC BLOOD PRESSURE: 78 MMHG | WEIGHT: 179.88 LBS

## 2022-07-18 DIAGNOSIS — R39.9 UTI SYMPTOMS: Primary | ICD-10-CM

## 2022-07-18 LAB
BILIRUB SERPL-MCNC: NEGATIVE MG/DL
BLOOD URINE, POC: NEGATIVE
CLARITY, POC UA: CLEAR
COLOR, POC UA: ABNORMAL
GLUCOSE UR QL STRIP: NORMAL
KETONES UR QL STRIP: NEGATIVE
LEUKOCYTE ESTERASE URINE, POC: ABNORMAL
NITRITE, POC UA: NEGATIVE
PH, POC UA: 5
PROTEIN, POC: NEGATIVE
SPECIFIC GRAVITY, POC UA: 1.02
UROBILINOGEN, POC UA: NORMAL

## 2022-07-18 PROCEDURE — 1101F PT FALLS ASSESS-DOCD LE1/YR: CPT | Mod: CPTII,S$GLB,, | Performed by: NURSE PRACTITIONER

## 2022-07-18 PROCEDURE — 3288F PR FALLS RISK ASSESSMENT DOCUMENTED: ICD-10-PCS | Mod: CPTII,S$GLB,, | Performed by: NURSE PRACTITIONER

## 2022-07-18 PROCEDURE — 87086 URINE CULTURE/COLONY COUNT: CPT | Performed by: NURSE PRACTITIONER

## 2022-07-18 PROCEDURE — 3077F SYST BP >= 140 MM HG: CPT | Mod: CPTII,S$GLB,, | Performed by: NURSE PRACTITIONER

## 2022-07-18 PROCEDURE — 99214 PR OFFICE/OUTPT VISIT, EST, LEVL IV, 30-39 MIN: ICD-10-PCS | Mod: S$GLB,,, | Performed by: NURSE PRACTITIONER

## 2022-07-18 PROCEDURE — 1159F PR MEDICATION LIST DOCUMENTED IN MEDICAL RECORD: ICD-10-PCS | Mod: CPTII,S$GLB,, | Performed by: NURSE PRACTITIONER

## 2022-07-18 PROCEDURE — 3077F PR MOST RECENT SYSTOLIC BLOOD PRESSURE >= 140 MM HG: ICD-10-PCS | Mod: CPTII,S$GLB,, | Performed by: NURSE PRACTITIONER

## 2022-07-18 PROCEDURE — 1101F PR PT FALLS ASSESS DOC 0-1 FALLS W/OUT INJ PAST YR: ICD-10-PCS | Mod: CPTII,S$GLB,, | Performed by: NURSE PRACTITIONER

## 2022-07-18 PROCEDURE — 1126F AMNT PAIN NOTED NONE PRSNT: CPT | Mod: CPTII,S$GLB,, | Performed by: NURSE PRACTITIONER

## 2022-07-18 PROCEDURE — 99214 OFFICE O/P EST MOD 30 MIN: CPT | Mod: S$GLB,,, | Performed by: NURSE PRACTITIONER

## 2022-07-18 PROCEDURE — 81002 URINALYSIS NONAUTO W/O SCOPE: CPT | Mod: S$GLB,,, | Performed by: NURSE PRACTITIONER

## 2022-07-18 PROCEDURE — 3078F PR MOST RECENT DIASTOLIC BLOOD PRESSURE < 80 MM HG: ICD-10-PCS | Mod: CPTII,S$GLB,, | Performed by: NURSE PRACTITIONER

## 2022-07-18 PROCEDURE — 1126F PR PAIN SEVERITY QUANTIFIED, NO PAIN PRESENT: ICD-10-PCS | Mod: CPTII,S$GLB,, | Performed by: NURSE PRACTITIONER

## 2022-07-18 PROCEDURE — 1159F MED LIST DOCD IN RCRD: CPT | Mod: CPTII,S$GLB,, | Performed by: NURSE PRACTITIONER

## 2022-07-18 PROCEDURE — 81002 POCT URINE DIPSTICK WITHOUT MICROSCOPE: ICD-10-PCS | Mod: S$GLB,,, | Performed by: NURSE PRACTITIONER

## 2022-07-18 PROCEDURE — 3078F DIAST BP <80 MM HG: CPT | Mod: CPTII,S$GLB,, | Performed by: NURSE PRACTITIONER

## 2022-07-18 PROCEDURE — 3288F FALL RISK ASSESSMENT DOCD: CPT | Mod: CPTII,S$GLB,, | Performed by: NURSE PRACTITIONER

## 2022-07-18 RX ORDER — AMOXICILLIN 500 MG/1
CAPSULE ORAL
Qty: 90 CAPSULE | Refills: 1 | Status: SHIPPED | OUTPATIENT
Start: 2022-07-18 | End: 2022-10-11

## 2022-07-18 NOTE — PROGRESS NOTES
Yue Chatman is a 77 y.o. female patient of Jose Montgomery MD who presents to the clinic today for   Chief Complaint   Patient presents with    Urinary Tract Infection     Symptoms started Friday or Saturday. Pelvic pain, urgency   .    HPI    Pt, who is known to me, reports a new problem to me suprapubic pain, + urgency, + pressure but hard to go, + frequency of urination.    These symptoms began 3-4 days  ago and status is recurrent.     Pt denies the following symptoms:  fever, nausea, vomiting.    Aggravating factors include + urinating.    Relieving factors include increasing fluids, Azo/pyridium, OTC analgesic.    OTC Medications tried are above.    Prescription medications taken for symptoms are none    Pertinent history:  + h/o UTIs/and has had a lot of bladder appointment.    ROS    Constitutional: No fever, no fatigue, no change in appetite.    GI:  No stomach ache, no nausea, no vomiting, no diarrhea, no constipation.    Urinary:  No dysuria, + frequency, + urgency, no flank pain.     HEENT/Heart/Lung/MS/Skin:  No symptoms or no changes.    Past Medical History:   Diagnosis Date    Acquired female bladder prolapse     Borderline diabetes     Cataract     Coronary artery disease 2009    NEGATIVE CARDIAC ANGIOGRAM PER PATIENT    Glucose intolerance (impaired glucose tolerance)     Hypertension     Nephrolithiasis     Obesity     Osteoporosis     Wears partial dentures     UPPERS       Current Outpatient Medications:     amLODIPine (NORVASC) 5 MG tablet, Take 1 tablet (5 mg total) by mouth once daily., Disp: 90 tablet, Rfl: 3    aspirin (ECOTRIN) 81 MG EC tablet, aspirin 81 mg tablet,delayed release  Take 1 tablet every day by oral route as directed for 90 days., Disp: , Rfl:     blood sugar diagnostic (TRUE METRIX GLUCOSE TEST STRIP) Strp, USE TO TEST TWICE A DAY, Disp: 200 strip, Rfl: 2    ergocalciferol (ERGOCALCIFEROL) 50,000 unit Cap, TAKE 1 CAPSULE BY MOUTH ONE TIME PER WEEK,  "Disp: 12 capsule, Rfl: 1    lancets (ONETOUCH DELICA LANCETS) 33 gauge Misc, 1 lancet by Misc.(Non-Drug; Combo Route) route 2 (two) times a day., Disp: 100 each, Rfl: 3    ramipriL (ALTACE) 10 MG capsule, Take 1 capsule (10 mg total) by mouth once daily., Disp: 90 capsule, Rfl: 3    TRUE METRIX GLUCOSE METER Misc, USE AS DIRECTED, Disp: 2 each, Rfl: 0    rosuvastatin (CRESTOR) 10 MG tablet, rosuvastatin 10 mg tablet  TAKE 1 TABLET BY MOUTH EVERY DAY, Disp: , Rfl:     Pt is nonsmoker.    ALLERGIES AND MEDICATIONS: updated and reviewed.  Review of patient's allergies indicates:   Allergen Reactions    Cephalexin Swelling    Ciprofloxacin Other (See Comments)     bothers her knee    Erythromycin Itching and Swelling    Azithromycin      Current Outpatient Medications   Medication Sig Dispense Refill    amLODIPine (NORVASC) 5 MG tablet Take 1 tablet (5 mg total) by mouth once daily. 90 tablet 3    aspirin (ECOTRIN) 81 MG EC tablet aspirin 81 mg tablet,delayed release   Take 1 tablet every day by oral route as directed for 90 days.      blood sugar diagnostic (TRUE METRIX GLUCOSE TEST STRIP) Strp USE TO TEST TWICE A  strip 2    ergocalciferol (ERGOCALCIFEROL) 50,000 unit Cap TAKE 1 CAPSULE BY MOUTH ONE TIME PER WEEK 12 capsule 1    lancets (ONETOUCH DELICA LANCETS) 33 gauge Misc 1 lancet by Misc.(Non-Drug; Combo Route) route 2 (two) times a day. 100 each 3    ramipriL (ALTACE) 10 MG capsule Take 1 capsule (10 mg total) by mouth once daily. 90 capsule 3    TRUE METRIX GLUCOSE METER Misc USE AS DIRECTED 2 each 0    rosuvastatin (CRESTOR) 10 MG tablet rosuvastatin 10 mg tablet   TAKE 1 TABLET BY MOUTH EVERY DAY       No current facility-administered medications for this visit.         PHYSICAL EXAM    Alert, coop 77 y.o. female patient in no acute distress, is not ill-appearing.    Vitals:    07/18/22 1635   BP: (!) 150/78   Pulse: 73   SpO2: 95%   Weight: 81.6 kg (179 lb 14.3 oz)   Height: 5' 3" (1.6 " m)       VS reviewed.  VS SBP elevated.  CC, nursing note, medications & PMH all reviewed today.    Head:  Normocephalic, atraumatic.    EENT:  Ext nose/ears normal.               Eye lids normal, no discharge present.                       Resp:  Respirations even, unlabored             Lungs CTA bilat.    Heart:  Heart rate normal.  RRR, no MRG on ausculation.    ABD:  Soft, round, mild suprapubic tenderness.  Normal BS in all 4 quadrants.  No rebound or organomegaly.              No peritoneal signs.      MS:  Ambulates independently with steady gait.        NEURO:  Alert and oriented x 4.  Responds appropriately during interaction.    Skin:  Warm, dry, color good..    Psych:  Responds appropriately throughout the visit.               Relaxed.  Well-groomed.    UTI symptoms  -     POCT URINE DIPSTICK WITHOUT MICROSCOPE  -     amoxicillin (AMOXIL) 500 MG capsule; 1 capsule 3x daily for 7days then 1 daily.  Dispense: 90 capsule; Refill: 1  -     Urine culture      Pt today presents with report of burning, urgency and freq of urination x 3 days. no blood noted in the urine.  No fever.  +  H/o UTIs with last being 6/2022.    Exam shows:  temp afebrile, abd lower suprapubic pain, no CVAT.      This is a new problem to me and the following work up is planned.    Lab & Radiological Tests Ordered: urinalysis/dip  The results are:  dip + for leuk.    I have reviewed the following additional tests today: urine culture 6/2022  showed Ampicillin & Macrobid antibiotic effectiveness.      Pt advised to perform comfort measures recommended on patient instruction sheet .    If not better in 2-3 days, the patient is advised to call here, the PCP team or [facility] ON [facility].  If worse or concerns, the patient is advised to call  here, the PCP team, [facility] or go to the URGENT CARE.  Explained exam findings, diagnosis and treatment plan to patient.  Questions answered and patient states understanding.

## 2022-07-19 ENCOUNTER — TELEPHONE (OUTPATIENT)
Dept: UROGYNECOLOGY | Facility: CLINIC | Age: 77
End: 2022-07-19
Payer: MEDICARE

## 2022-07-19 NOTE — TELEPHONE ENCOUNTER
----- Message from Caitie Corley, Patient Care Assistant sent at 7/19/2022 12:31 PM CDT -----  Contact: Pt  Type: Needs Medical Advice    Who Called: Pt  Best Call Back Number: [phone]  Inquiry/Question: Pt is calling to schedule her procedure. Pt will be cleared on 07/28/2022 so she would like to be scheduled some time after then. Please call back and advise. Thank you~

## 2022-07-19 NOTE — TELEPHONE ENCOUNTER
----- Message from Rosalina Hatfield sent at 7/19/2022  4:25 PM CDT -----  Contact: patient  Type: Needs Medical Advice    Who Called: Pt  Best Call Back Number: [phone]  Inquiry/Question: Pt is calling to schedule her procedure. Pt will be cleared on 07/28/2022 so she would like to be scheduled some time after then. Please call back and advise. Thank you~

## 2022-07-19 NOTE — TELEPHONE ENCOUNTER
Spoke to pt and informed that I will need to check with DR Keenan to see when he can schedule her surgery, states she is getting clearance from cardiology on the 28th. Please advise on surgery date?

## 2022-07-20 LAB — BACTERIA UR CULT: NORMAL

## 2022-07-25 DIAGNOSIS — N81.9 VAGINAL VAULT PROLAPSE: Primary | ICD-10-CM

## 2022-07-25 DIAGNOSIS — K46.9 ENTEROCELE: ICD-10-CM

## 2022-07-25 RX ORDER — SODIUM CHLORIDE 9 MG/ML
INJECTION, SOLUTION INTRAVENOUS CONTINUOUS
Status: CANCELLED | OUTPATIENT
Start: 2022-07-25

## 2022-07-27 NOTE — TELEPHONE ENCOUNTER
Left message that cardiology can send a message if okay for surgery to [phone] and she can call me back at [phone].

## 2022-07-28 ENCOUNTER — PATIENT MESSAGE (OUTPATIENT)
Dept: UROGYNECOLOGY | Facility: CLINIC | Age: 77
End: 2022-07-28
Payer: MEDICARE

## 2022-07-28 ENCOUNTER — TELEPHONE (OUTPATIENT)
Dept: UROGYNECOLOGY | Facility: CLINIC | Age: 77
End: 2022-07-28

## 2022-07-28 NOTE — TELEPHONE ENCOUNTER
----- Message from Kate Matias sent at 7/28/2022  4:05 PM CDT -----  Contact: [phone]  Type:  Patient Returning Call    Who Called: Pt  Who Left Message for Patient:  Lilli  Does the patient know what this is regarding?: Yes scheduling     Best Call Back Number:  [phone]    Additional Information:  Pls call back

## 2022-08-05 ENCOUNTER — TELEPHONE (OUTPATIENT)
Dept: UROGYNECOLOGY | Facility: CLINIC | Age: 77
End: 2022-08-05
Payer: MEDICARE

## 2022-08-05 NOTE — TELEPHONE ENCOUNTER
----- Message from Tonia Grossman sent at 8/5/2022  9:38 AM CDT -----  Regarding: pt called  Name of Who is Calling: GARIMA FULLER [[id number]]      What is the request in detail: pt was requesting to speak with Lilli and did not disclose the nature of the call. Please advise       Can the clinic reply by [facility]: No      What Number [facility] Back [facility]: [phone]

## 2022-08-05 NOTE — TELEPHONE ENCOUNTER
Called and spoke to patient states that she is having the pacemaker placed on the 8th and staples removed on the 18 th. Informed that did discuss with Dr Keenan about her case and we are going to hold off surgery till everything is fine with the pacemaker. That having surgery so close together with anesthesia is not good on her body. States understanding and will call wants everything is cleared to have surgery.

## 2022-08-05 NOTE — TELEPHONE ENCOUNTER
----- Message from Lilli Bruno LPN sent at 7/27/2022  4:03 PM CDT -----  Left message that cardiology can send a message if okay for surgery to [phone] and she can call me back at [phone].      Documentation     You  Chatman Anycate S Just now (4:02 PM)    Geovani Keenan MD  You 2 days ago        August 18   Message text      You  Geovani Keenan MD 5 days ago    AS    Just double checking you mean August 18th right?   Message text      Geovani Keenan MD  You 6 days ago        Can do her surgery on April 18th if that works for her   Message text      You routed conversation to Geovani Keenan MD 8 days ago    You 8 days ago    AS         Spoke to pt and informed that I will need to check with DR Keenan to see when he can schedule her surgery, states she is getting clearance from cardiology on the 28th. Please advise on surgery date?

## 2022-08-22 ENCOUNTER — TELEPHONE (OUTPATIENT)
Dept: UROGYNECOLOGY | Facility: CLINIC | Age: 77
End: 2022-08-22
Payer: MEDICARE

## 2022-08-22 NOTE — TELEPHONE ENCOUNTER
Spoke to  patient and she states that she wants to get on the books so that she can get the surgery, had pacemaker 2 weeks ago, informed that she has to get clearnace from cardiology before we katina her surgery , she is going to call them tomorrow.

## 2022-08-22 NOTE — TELEPHONE ENCOUNTER
----- Message from Jill Alvarado sent at 8/22/2022  3:58 PM CDT -----  Contact: self  Patient is requesting a call back from Lilli to chantale her surgery, call back at [phone] and thanks

## 2022-08-29 ENCOUNTER — TELEPHONE (OUTPATIENT)
Dept: UROGYNECOLOGY | Facility: CLINIC | Age: 77
End: 2022-08-29
Payer: MEDICARE

## 2022-08-29 NOTE — TELEPHONE ENCOUNTER
----- Message from Tara Bauer sent at 8/29/2022  9:02 AM CDT -----  Contact: pt  Type: Needs Medical Advice  Who Called:  pt   Best Call Back Number: [phone]     Additional Information: pt is calling the office to speak to Lilli or the team.. please call and adv-

## 2022-08-29 NOTE — TELEPHONE ENCOUNTER
----- Message from Tara Bauer sent at 8/29/2022  3:12 PM CDT -----  Contact: pt  Contact: pt  Type: Needs Medical Advice  Who Called:  pt   Best Call Back Number: [phone]      Additional Information: pt is calling the office to speak to Lilli or the team.. please call and adv-

## 2022-08-29 NOTE — TELEPHONE ENCOUNTER
Patient called and states  that she feels pain down in the lower abdomen , was given Amoxil to take 3 times a day for 7 days, was told that if that does not help and still in pain then she would need to be seen. Wanted to let Dr Keenan know.

## 2022-08-31 ENCOUNTER — TELEPHONE (OUTPATIENT)
Dept: UROGYNECOLOGY | Facility: CLINIC | Age: 77
End: 2022-08-31
Payer: MEDICARE

## 2022-08-31 NOTE — TELEPHONE ENCOUNTER
----- Message from Courtney Washington sent at 8/31/2022 10:06 AM CDT -----  Contact: pt  Type: Needs Medical Advice    Who Called: pt  Best Call Back Number: [phone]    Inquiry/Question: pt would like a call back. She states she is having a problem with her bladder.        Thank you~

## 2022-09-02 ENCOUNTER — TELEPHONE (OUTPATIENT)
Dept: UROGYNECOLOGY | Facility: CLINIC | Age: 77
End: 2022-09-02
Payer: MEDICARE

## 2022-09-02 NOTE — TELEPHONE ENCOUNTER
----- Message from Caitie Corley, Patient Care Assistant sent at 9/2/2022  9:40 AM CDT -----  Contact: Pt  Type: Needs Medical Advice    Who Called: Pt  Best Call Back Number: [phone]  Inquiry/Question: Pt is calling to speak with the nurse about an appt. Please call back and advise. Thank you~

## 2022-09-02 NOTE — TELEPHONE ENCOUNTER
Called and spoke to pt , change appt with Keenan till 11/7 states that she has appt with Duly on 9/29 and then pace maker check on 11/1 so appt rescheduled.

## 2022-09-08 ENCOUNTER — TELEPHONE (OUTPATIENT)
Dept: UROGYNECOLOGY | Facility: CLINIC | Age: 77
End: 2022-09-08
Payer: MEDICARE

## 2022-09-08 NOTE — TELEPHONE ENCOUNTER
Called and spoke to Kg, was asking when patient was having surgery. Advised that, have spoken to Mrs Chatman several times and informed her that she would have to get clearance from cardiology before Dr Keenan would schedule surgery date. Kg states  and wife has been asking for clearance , informed that we have informed her that after a pacemaker it could be several months before approval is given. Requested office to fax the request and when patient has appointment on September 29 th with Dr Hahn, will ask him then and let us know.

## 2022-09-08 NOTE — TELEPHONE ENCOUNTER
----- Message from Alejandra Wali sent at 9/8/2022  2:31 PM CDT -----  Contact: Kg  Type:  Needs Medical Advice    Who Called:  Kg with [facility]      Would the patient rather a call back or a response via [facility]? Call     Best Call Back Number: [phone]    Additional Information: Kg with [facility] would like to speak with nurse about a clearance for surgery. She stated that patient's  stopped by their clinic and she is wanting verify what it is that needs to be sent.     Please call to advise

## 2022-09-09 NOTE — TELEPHONE ENCOUNTER
----- Message from Evy Bains sent at 9/9/2022 12:23 PM CDT -----  Contact: Kg  Type:  Patient Call          Who Called:Dr Selma Saul         Does the patient know what this is regarding?: requesting a call back to discuss clearance ;please advise               Best Call Back Number: [phone] Ext [phone]            Additional Information:

## 2022-09-13 ENCOUNTER — TELEPHONE (OUTPATIENT)
Dept: UROGYNECOLOGY | Facility: CLINIC | Age: 77
End: 2022-09-13

## 2022-09-13 NOTE — TELEPHONE ENCOUNTER
Spoke with pt, she has had her pacemaker put in and will bring paperwork from  stating that she is cleared for sx

## 2022-09-19 ENCOUNTER — TELEPHONE (OUTPATIENT)
Dept: UROGYNECOLOGY | Facility: CLINIC | Age: 77
End: 2022-09-19
Payer: MEDICARE

## 2022-09-19 NOTE — TELEPHONE ENCOUNTER
Spoke to pt and she is on the daily antibiotics but it is not working is going to come in tomorrow and see dr kay and also discuss surgery.

## 2022-09-19 NOTE — TELEPHONE ENCOUNTER
----- Message from Rosalina Hatfield sent at 9/19/2022  1:32 PM CDT -----  Contact: patient  Type: Needs Medical Advice  Who Called:  patient  Best Call Back Number: [phone] (home)   Additional Information: patient is requesting a call back regarding her upcoming appt- she is experiencing uti symptoms again

## 2022-09-20 ENCOUNTER — OFFICE VISIT (OUTPATIENT)
Dept: UROGYNECOLOGY | Facility: CLINIC | Age: 77
End: 2022-09-20
Payer: MEDICARE

## 2022-09-20 VITALS
DIASTOLIC BLOOD PRESSURE: 68 MMHG | BODY MASS INDEX: 31.87 KG/M2 | SYSTOLIC BLOOD PRESSURE: 155 MMHG | HEIGHT: 63 IN | HEART RATE: 59 BPM | WEIGHT: 179.88 LBS

## 2022-09-20 DIAGNOSIS — K46.9 ENTEROCELE: ICD-10-CM

## 2022-09-20 DIAGNOSIS — N81.9 VAGINAL VAULT PROLAPSE: Primary | ICD-10-CM

## 2022-09-20 DIAGNOSIS — N39.0 RECURRENT UTI: ICD-10-CM

## 2022-09-20 LAB
BILIRUB SERPL-MCNC: NORMAL MG/DL
BLOOD URINE, POC: NORMAL
CLARITY, POC UA: CLEAR
COLOR, POC UA: YELLOW
GLUCOSE UR QL STRIP: NORMAL
KETONES UR QL STRIP: NORMAL
LEUKOCYTE ESTERASE URINE, POC: NORMAL
NITRITE, POC UA: NORMAL
PH, POC UA: 5
PROTEIN, POC: NORMAL
SPECIFIC GRAVITY, POC UA: 1.02
UROBILINOGEN, POC UA: NORMAL

## 2022-09-20 PROCEDURE — 99999 PR PBB SHADOW E&M-EST. PATIENT-LVL IV: CPT | Mod: PBBFAC,,, | Performed by: OBSTETRICS & GYNECOLOGY

## 2022-09-20 PROCEDURE — 3077F SYST BP >= 140 MM HG: CPT | Mod: CPTII,S$GLB,, | Performed by: OBSTETRICS & GYNECOLOGY

## 2022-09-20 PROCEDURE — 3078F PR MOST RECENT DIASTOLIC BLOOD PRESSURE < 80 MM HG: ICD-10-PCS | Mod: CPTII,S$GLB,, | Performed by: OBSTETRICS & GYNECOLOGY

## 2022-09-20 PROCEDURE — 1159F MED LIST DOCD IN RCRD: CPT | Mod: CPTII,S$GLB,, | Performed by: OBSTETRICS & GYNECOLOGY

## 2022-09-20 PROCEDURE — 99213 PR OFFICE/OUTPT VISIT, EST, LEVL III, 20-29 MIN: ICD-10-PCS | Mod: S$GLB,,, | Performed by: OBSTETRICS & GYNECOLOGY

## 2022-09-20 PROCEDURE — 81002 POCT URINE DIPSTICK WITHOUT MICROSCOPE: ICD-10-PCS | Mod: S$GLB,,, | Performed by: OBSTETRICS & GYNECOLOGY

## 2022-09-20 PROCEDURE — 3077F PR MOST RECENT SYSTOLIC BLOOD PRESSURE >= 140 MM HG: ICD-10-PCS | Mod: CPTII,S$GLB,, | Performed by: OBSTETRICS & GYNECOLOGY

## 2022-09-20 PROCEDURE — 81002 URINALYSIS NONAUTO W/O SCOPE: CPT | Mod: S$GLB,,, | Performed by: OBSTETRICS & GYNECOLOGY

## 2022-09-20 PROCEDURE — 99999 PR PBB SHADOW E&M-EST. PATIENT-LVL IV: ICD-10-PCS | Mod: PBBFAC,,, | Performed by: OBSTETRICS & GYNECOLOGY

## 2022-09-20 PROCEDURE — 3288F FALL RISK ASSESSMENT DOCD: CPT | Mod: CPTII,S$GLB,, | Performed by: OBSTETRICS & GYNECOLOGY

## 2022-09-20 PROCEDURE — 1101F PR PT FALLS ASSESS DOC 0-1 FALLS W/OUT INJ PAST YR: ICD-10-PCS | Mod: CPTII,S$GLB,, | Performed by: OBSTETRICS & GYNECOLOGY

## 2022-09-20 PROCEDURE — 1101F PT FALLS ASSESS-DOCD LE1/YR: CPT | Mod: CPTII,S$GLB,, | Performed by: OBSTETRICS & GYNECOLOGY

## 2022-09-20 PROCEDURE — 3288F PR FALLS RISK ASSESSMENT DOCUMENTED: ICD-10-PCS | Mod: CPTII,S$GLB,, | Performed by: OBSTETRICS & GYNECOLOGY

## 2022-09-20 PROCEDURE — 3078F DIAST BP <80 MM HG: CPT | Mod: CPTII,S$GLB,, | Performed by: OBSTETRICS & GYNECOLOGY

## 2022-09-20 PROCEDURE — 99213 OFFICE O/P EST LOW 20 MIN: CPT | Mod: S$GLB,,, | Performed by: OBSTETRICS & GYNECOLOGY

## 2022-09-20 PROCEDURE — 1159F PR MEDICATION LIST DOCUMENTED IN MEDICAL RECORD: ICD-10-PCS | Mod: CPTII,S$GLB,, | Performed by: OBSTETRICS & GYNECOLOGY

## 2022-09-20 RX ORDER — SODIUM CHLORIDE 9 MG/ML
INJECTION, SOLUTION INTRAVENOUS CONTINUOUS
Status: CANCELLED | OUTPATIENT
Start: 2022-09-20

## 2022-09-20 RX ORDER — MUPIROCIN 20 MG/G
OINTMENT TOPICAL
Status: CANCELLED | OUTPATIENT
Start: 2022-09-20

## 2022-09-20 RX ORDER — TRIMETHOPRIM 100 MG/1
100 TABLET ORAL EVERY 12 HOURS
Qty: 6 TABLET | Refills: 0 | Status: ON HOLD | OUTPATIENT
Start: 2022-10-17 | End: 2022-10-20

## 2022-09-20 NOTE — PROGRESS NOTES
Subjective:     Chief Complaint:  Vaginal vault prolapse  History of Present Illness: Yue Chatman is a 77 y.o. female who presents for enterocele and vaginal vault prolapse.  She mostly complains of pain mass effect and often recurrent UTIs.  She said she had symptoms of UTI yesterday and start antibiotics and it has worked already.  Her urinalysis today is normal.  She had previous suburethral sling and does not have urinary incontinence She does have surgical clearance from her cardiologist and is to stop her aspirin 3 days preoperatively.  She has previously been scheduled for surgery on more than 1 occasion but it had to be canceled because of medical problems including a cardiac pacemaker.  She has a ventral hernia but her surgeon told her did not need to be repaired    Review of Systems    Constitutional:  Felt poorly yesterday because of UTI  Eyes:  Cataracts  ENT: No headaches.   Respiratory: No SOB.  Cardiovascular: Hypertension CAD  Gastrointestinal:  Ventral hernia  Genitourinary:  Nephrolithiasis  Integument/Breast: Negative  Hematologic/Lymphatic:  Vitamin D deficiency  Musculoskeletal:  Osteoporosis  Neurological: No known disc problems. No paresthesias.  Behavioral/Psych: No history of depression.   Endocrine:  Impaired glucose intolerance  Allergy/Immune: no recent reactions     Objective:   General Exam:  General appearance:  Wearing mask  HEENT:  Eyeglasses  Neck: Normal thyroid.   Back: No CVA tenderness.  RESP: No SOB.  Breasts: deferred  Abdomen: Benign without localizing signs.  Extremities: edema.   Lymphatic: noncontributory  Skin: No rashes. No lesions.  Neurologic: Intact.   Psych: Oriented.   Pelvic Exam:  V:  No lesions. No palpable nodes.  Enlarged hiatus  Va:  No lesions but thin epithelium.  Total vault eversion and palpable bowel in enterocele   .Meatus:No caruncle or stenosis  Urethra: Non tender. No suburethral masses.  Cx/Cuff:  Total vault eversion  Uterus: Surgically  absent.  Ad: No mass or tenderness.  Levators :Symmetrical. Normal tone. Non tender.  BL: Non tender  RV:  External hemorrhoids.  Assessment:   Enterocele, vaginal vault prolapse.  History of poorly documented recurrent UTIs  Has surgical clearance       Plan:    Enterocele repair and partial colpocleisis  Will start her on oral antibiotics 3 days preoperatively because of her recurrent UTIs

## 2022-10-11 ENCOUNTER — OFFICE VISIT (OUTPATIENT)
Dept: UROGYNECOLOGY | Facility: CLINIC | Age: 77
End: 2022-10-11
Payer: MEDICARE

## 2022-10-11 ENCOUNTER — TELEPHONE (OUTPATIENT)
Dept: UROGYNECOLOGY | Facility: CLINIC | Age: 77
End: 2022-10-11

## 2022-10-11 ENCOUNTER — HOSPITAL ENCOUNTER (OUTPATIENT)
Dept: PREADMISSION TESTING | Facility: HOSPITAL | Age: 77
Discharge: HOME | End: 2022-10-11
Attending: OBSTETRICS & GYNECOLOGY
Payer: MEDICARE

## 2022-10-11 VITALS — BODY MASS INDEX: 31.87 KG/M2 | HEIGHT: 63 IN | WEIGHT: 179.88 LBS

## 2022-10-11 DIAGNOSIS — N81.10 CYSTOCELE WITH RECTOCELE: ICD-10-CM

## 2022-10-11 DIAGNOSIS — Z01.818 PRE-OP EXAM: ICD-10-CM

## 2022-10-11 DIAGNOSIS — K46.9 ENTEROCELE: Primary | ICD-10-CM

## 2022-10-11 DIAGNOSIS — N81.6 CYSTOCELE WITH RECTOCELE: ICD-10-CM

## 2022-10-11 DIAGNOSIS — K46.9 ENTEROCELE: ICD-10-CM

## 2022-10-11 DIAGNOSIS — Z01.818 PREOP TESTING: Primary | ICD-10-CM

## 2022-10-11 DIAGNOSIS — N81.9 VAGINAL VAULT PROLAPSE: ICD-10-CM

## 2022-10-11 LAB
ANION GAP SERPL CALC-SCNC: 11 MMOL/L (ref 8–16)
BASOPHILS # BLD AUTO: 0.04 K/UL (ref 0–0.2)
BASOPHILS NFR BLD: 0.6 % (ref 0–1.9)
BUN SERPL-MCNC: 17 MG/DL (ref 8–23)
CALCIUM SERPL-MCNC: 9.3 MG/DL (ref 8.7–10.5)
CHLORIDE SERPL-SCNC: 105 MMOL/L (ref 95–110)
CO2 SERPL-SCNC: 24 MMOL/L (ref 23–29)
CREAT SERPL-MCNC: 0.8 MG/DL (ref 0.5–1.4)
DIFFERENTIAL METHOD: ABNORMAL
EOSINOPHIL # BLD AUTO: 0.1 K/UL (ref 0–0.5)
EOSINOPHIL NFR BLD: 1.4 % (ref 0–8)
ERYTHROCYTE [DISTWIDTH] IN BLOOD BY AUTOMATED COUNT: 14 % (ref 11.5–14.5)
EST. GFR  (NO RACE VARIABLE): >60 ML/MIN/1.73 M^2
GLUCOSE SERPL-MCNC: 88 MG/DL (ref 70–110)
HCT VFR BLD AUTO: 43.4 % (ref 37–48.5)
HGB BLD-MCNC: 13.5 G/DL (ref 12–16)
IMM GRANULOCYTES # BLD AUTO: 0.01 K/UL (ref 0–0.04)
IMM GRANULOCYTES NFR BLD AUTO: 0.2 % (ref 0–0.5)
LYMPHOCYTES # BLD AUTO: 2.3 K/UL (ref 1–4.8)
LYMPHOCYTES NFR BLD: 34.9 % (ref 18–48)
MCH RBC QN AUTO: 27.2 PG (ref 27–31)
MCHC RBC AUTO-ENTMCNC: 31.1 G/DL (ref 32–36)
MCV RBC AUTO: 87 FL (ref 82–98)
MONOCYTES # BLD AUTO: 0.6 K/UL (ref 0.3–1)
MONOCYTES NFR BLD: 8.9 % (ref 4–15)
NEUTROPHILS # BLD AUTO: 3.5 K/UL (ref 1.8–7.7)
NEUTROPHILS NFR BLD: 54 % (ref 38–73)
NRBC BLD-RTO: 0 /100 WBC
PLATELET # BLD AUTO: 183 K/UL (ref 150–450)
PMV BLD AUTO: 12.8 FL (ref 9.2–12.9)
POTASSIUM SERPL-SCNC: 4 MMOL/L (ref 3.5–5.1)
RBC # BLD AUTO: 4.97 M/UL (ref 4–5.4)
SODIUM SERPL-SCNC: 140 MMOL/L (ref 136–145)
WBC # BLD AUTO: 6.5 K/UL (ref 3.9–12.7)

## 2022-10-11 PROCEDURE — 80048 BASIC METABOLIC PNL TOTAL CA: CPT | Performed by: OBSTETRICS & GYNECOLOGY

## 2022-10-11 PROCEDURE — 99999 PR PBB SHADOW E&M-EST. PATIENT-LVL III: CPT | Mod: PBBFAC,,, | Performed by: OBSTETRICS & GYNECOLOGY

## 2022-10-11 PROCEDURE — 99900103 DSU ONLY-NO CHARGE-INITIAL HR (STAT)

## 2022-10-11 PROCEDURE — 99213 OFFICE O/P EST LOW 20 MIN: CPT | Mod: S$GLB,,, | Performed by: OBSTETRICS & GYNECOLOGY

## 2022-10-11 PROCEDURE — 99900104 DSU ONLY-NO CHARGE-EA ADD'L HR (STAT)

## 2022-10-11 PROCEDURE — 99213 PR OFFICE/OUTPT VISIT, EST, LEVL III, 20-29 MIN: ICD-10-PCS | Mod: S$GLB,,, | Performed by: OBSTETRICS & GYNECOLOGY

## 2022-10-11 PROCEDURE — 99999 PR PBB SHADOW E&M-EST. PATIENT-LVL III: ICD-10-PCS | Mod: PBBFAC,,, | Performed by: OBSTETRICS & GYNECOLOGY

## 2022-10-11 PROCEDURE — 1101F PT FALLS ASSESS-DOCD LE1/YR: CPT | Mod: CPTII,S$GLB,, | Performed by: OBSTETRICS & GYNECOLOGY

## 2022-10-11 PROCEDURE — 85025 COMPLETE CBC W/AUTO DIFF WBC: CPT | Performed by: ANESTHESIOLOGY

## 2022-10-11 PROCEDURE — 1101F PR PT FALLS ASSESS DOC 0-1 FALLS W/OUT INJ PAST YR: ICD-10-PCS | Mod: CPTII,S$GLB,, | Performed by: OBSTETRICS & GYNECOLOGY

## 2022-10-11 PROCEDURE — 36415 COLL VENOUS BLD VENIPUNCTURE: CPT | Performed by: OBSTETRICS & GYNECOLOGY

## 2022-10-11 PROCEDURE — 3288F FALL RISK ASSESSMENT DOCD: CPT | Mod: CPTII,S$GLB,, | Performed by: OBSTETRICS & GYNECOLOGY

## 2022-10-11 PROCEDURE — 3288F PR FALLS RISK ASSESSMENT DOCUMENTED: ICD-10-PCS | Mod: CPTII,S$GLB,, | Performed by: OBSTETRICS & GYNECOLOGY

## 2022-10-11 RX ORDER — MAGNESIUM 30 MG
TABLET ORAL DAILY
COMMUNITY

## 2022-10-11 RX ORDER — SULFAMETHOXAZOLE AND TRIMETHOPRIM 800; 160 MG/1; MG/1
1 TABLET ORAL 2 TIMES DAILY
Qty: 6 TABLET | Refills: 1 | Status: SHIPPED | OUTPATIENT
Start: 2022-10-11 | End: 2022-12-05 | Stop reason: ALTCHOICE

## 2022-10-11 NOTE — DISCHARGE INSTRUCTIONS
To confirm, Your doctor has instructed you that surgery is scheduled for:     Please report to [facility], Registration the morning of surgery. You must check-in and receive a wristband before going to your procedure.    Pre-Op will call the afternoon prior to surgery between 1:00 and 6:00 PM with the final arrival time.  Phone number: [phone]    PLEASE NOTE:  The surgery schedule has many variables which may affect the time of your surgery case.  Family members should be available if your surgery time changes.  Plan to be here the day of your procedure between 4-6 hours.    MEDICATIONS:  TAKE ONLY THESE MEDICATIONS WITH A SMALL SIP OF WATER THE MORNING OF YOUR PROCEDURE:      DO NOT TAKE THESE MEDICATIONS 5-7 DAYS PRIOR to your procedure or per your surgeon's request:   ASPIRIN, ALEVE, ADVIL, IBUPROFEN, FISH OIL VITAMIN E, HERBALS  (May take Tylenol)    ONLY if you are prescribed any types of blood thinners such as:  Aspirin, Coumadin, Plavix, Pradaxa, Xarelto, Aggrenox, Effient, Eliquis, Savasya, Brilinta, or any other, ask your surgeon whether you should stop taking them and how long before surgery you should stop.  You may also need to verify with the prescribing physician if it is ok to stop your medication.      INSTRUCTIONS IMPORTANT!!  Do not eat or drink anything between midnight and the time of your procedure- this includes gum, mints, and candy.  Do not smoke or drink alcoholic beverages 24 hours prior to your procedure.  Shower the night before AND the morning of your procedure with a Chlorhexidine wash such as Hibiclens or Dial antibacterial soap from the neck down.  Do not get it on your face or in your eyes.  You may use your own shampoo and face wash. This helps your skin to be as bacteria free as possible.    If you wear contact lenses, dentures, hearing aids or glasses, bring a container to put them in during surgery and give to a family member for safe keeping.  Please  leave all jewelry, piercing's and valuables at home.   DO NOT remove hair from the surgery site.  Do not shave the incision site unless you are given specific instructions to do so.    ONLY if you have been diagnosed with sleep apnea please bring your C-PAP machine.  ONLY if you wear home oxygen please bring your portable oxygen tank the day of your procedure.  ONLY if you have a history of OPEN HEART SURGERY you will need a clearance from your Cardiologist per Anesthesia.      ONLY for patients requiring bowel prep, written instructions will be given by your doctor's office.  ONLY if you have a neuro stimulator, please bring the controller with you the morning of surgery  ONLY if a type and screen test is needed before surgery, please return:  If your doctor has scheduled you for an overnight stay, bring a small overnight bag with any personal items you need.  Make arrangements in advance for transportation home by a responsible adult.  It is not safe to drive a vehicle during the 24 hours after anesthesia.      [facility] Visitor Policy    Effective September 26, 2022    [facility] will resume routine visitation for COVID-19 negative patients, including inpatients, outpatients, and procedural areas, in accordance with local campus procedures.    All [facility] facilities and properties are tobacco free.  Smoking is NOT allowed.   If you have any questions about these instructions, call Pre-Op Admit  Nursing at [phone] or the Pre-Op Day Surgery Unit at [phone].

## 2022-10-11 NOTE — TELEPHONE ENCOUNTER
Spoke to  pt and informed that Dr Keenan would like her to start the Bactrim antibiotic on Monday and take it for three days before surgery. Due to her history of UTI's

## 2022-10-11 NOTE — PROGRESS NOTES
Subjective:     Chief Complaint:  Prolapse  History of Present Illness: Yue Chatman is a 77 y.o. female who presents for enterocele, vaginal vault prolapse.  She has a ventral hernia which precludes abdominal procedure.  She is scheduled surgery several times it had to postpone for medical reasons.  Most recently she had a respiratory infection which appears to have resolved.  She has surgical clearance.  She takes baby aspirin will stop it 3 days before surgery.  Because of recurrent UTIs we will start her on Bactrim prior to surgery.  She had previous suburethral sling and denies stress incontinence    Review of Systems    Constitutional:  Pacemaker  Eyes: No vision changes.  ENT:  Cataracts   Respiratory: No SOB.  Cardiovascular: Hypertension hyperlipidemia  Gastrointestinal:  Ventral hernia  Genitourinary: No vaginal bleeding or discharge.  Integument/Breast: Negative  Hematologic/Lymphatic: No history of anemia.  Musculoskeletal: No major back pain. No abdominal pain.  Neurological:  Vertigo  Behavioral/Psych: No history of depression.   Endocrine:  Impaired glucose tolerance  Allergy/Immune: no recent reactions     Objective:   General Exam:  General appearance: WDNF. NAD.   HEENT: Theodora. EOM's intact.  Neck: Normal thyroid.   Back: No CVA tenderness.  RESP: No SOB.  Breasts: deferred  Abdomen:  Ventral hernia without  incarceration  Extremities:  edema.  Superficial varices.  Lymphatic: noncontributory  Skin: No rashes. No lesions.  Neurologic: Intact.   Psych: Oriented.   Pelvic Exam:  V:  No lesions. No palpable nodes.   Va:No d/c bleeding or lesions.   .Meatus:No caruncle or stenosis  Urethra: Non tender. No suburethral masses.  Sling is not palpable or rigid or tender  Cx/Cuff:  Central vault prolapse to a +5 position.  Thin epithelium but no ulcerations.  Bowel is palpable in enterocele posteriorly  Uterus:  Surgically absent  Ad: No mass or tenderness.  Levators :Symmetrical. Normal tone. Non  tender.  BL: Non tender  RV:  External hemorrhoids.  Assessment:   Vaginal vault prolapse with enterocele       Plan:    Anterior posterior colporrhaphy with colposuspension and enterocele repair

## 2022-10-17 ENCOUNTER — ANESTHESIA EVENT (OUTPATIENT)
Dept: SURGERY | Facility: HOSPITAL | Age: 77
End: 2022-10-17
Payer: MEDICARE

## 2022-10-17 RX ORDER — SODIUM CHLORIDE, SODIUM LACTATE, POTASSIUM CHLORIDE, CALCIUM CHLORIDE 600; 310; 30; 20 MG/100ML; MG/100ML; MG/100ML; MG/100ML
INJECTION, SOLUTION INTRAVENOUS CONTINUOUS
Status: CANCELLED | OUTPATIENT
Start: 2022-10-17

## 2022-10-20 ENCOUNTER — HOSPITAL ENCOUNTER (OUTPATIENT)
Facility: HOSPITAL | Age: 77
Discharge: HOME | End: 2022-10-21
Attending: OBSTETRICS & GYNECOLOGY | Admitting: OBSTETRICS & GYNECOLOGY
Payer: MEDICARE

## 2022-10-20 ENCOUNTER — ANESTHESIA (OUTPATIENT)
Dept: SURGERY | Facility: HOSPITAL | Age: 77
End: 2022-10-20
Payer: MEDICARE

## 2022-10-20 DIAGNOSIS — N81.89 PELVIC RELAXATION: ICD-10-CM

## 2022-10-20 DIAGNOSIS — N81.9 VAGINAL VAULT PROLAPSE: Primary | ICD-10-CM

## 2022-10-20 DIAGNOSIS — K46.9 ENTEROCELE: ICD-10-CM

## 2022-10-20 LAB
ANION GAP SERPL CALC-SCNC: 13 MMOL/L (ref 8–16)
BUN SERPL-MCNC: 14 MG/DL (ref 8–23)
CALCIUM SERPL-MCNC: 9.6 MG/DL (ref 8.7–10.5)
CHLORIDE SERPL-SCNC: 105 MMOL/L (ref 95–110)
CO2 SERPL-SCNC: 19 MMOL/L (ref 23–29)
CREAT SERPL-MCNC: 1.2 MG/DL (ref 0.5–1.4)
EST. GFR  (NO RACE VARIABLE): 47 ML/MIN/1.73 M^2
GLUCOSE SERPL-MCNC: 132 MG/DL (ref 70–110)
POCT GLUCOSE: 151 MG/DL (ref 70–110)
POTASSIUM SERPL-SCNC: 4.6 MMOL/L (ref 3.5–5.1)
SODIUM SERPL-SCNC: 137 MMOL/L (ref 136–145)

## 2022-10-20 PROCEDURE — 63600175 PHARM REV CODE 636 W HCPCS: Performed by: OBSTETRICS & GYNECOLOGY

## 2022-10-20 PROCEDURE — D9220A PRA ANESTHESIA: Mod: ANES,,, | Performed by: ANESTHESIOLOGY

## 2022-10-20 PROCEDURE — C2627 CATH, SUPRAPUBIC/CYSTOSCOPIC: HCPCS | Performed by: OBSTETRICS & GYNECOLOGY

## 2022-10-20 PROCEDURE — 37000009 HC ANESTHESIA EA ADD 15 MINS: Performed by: OBSTETRICS & GYNECOLOGY

## 2022-10-20 PROCEDURE — D9220A PRA ANESTHESIA: ICD-10-PCS | Mod: CRNA,,, | Performed by: REGISTERED NURSE

## 2022-10-20 PROCEDURE — 25000003 PHARM REV CODE 250: Performed by: OBSTETRICS & GYNECOLOGY

## 2022-10-20 PROCEDURE — 36000709 HC OR TIME LEV III EA ADD 15 MIN: Performed by: OBSTETRICS & GYNECOLOGY

## 2022-10-20 PROCEDURE — D9220A PRA ANESTHESIA: ICD-10-PCS | Mod: ANES,,, | Performed by: ANESTHESIOLOGY

## 2022-10-20 PROCEDURE — 71000039 HC RECOVERY, EACH ADD'L HOUR: Performed by: OBSTETRICS & GYNECOLOGY

## 2022-10-20 PROCEDURE — D9220A PRA ANESTHESIA: Mod: CRNA,,, | Performed by: REGISTERED NURSE

## 2022-10-20 PROCEDURE — 37000008 HC ANESTHESIA 1ST 15 MINUTES: Performed by: OBSTETRICS & GYNECOLOGY

## 2022-10-20 PROCEDURE — 94799 UNLISTED PULMONARY SVC/PX: CPT

## 2022-10-20 PROCEDURE — 71000033 HC RECOVERY, INTIAL HOUR: Performed by: OBSTETRICS & GYNECOLOGY

## 2022-10-20 PROCEDURE — 27000221 HC OXYGEN, UP TO 24 HOURS

## 2022-10-20 PROCEDURE — 36000708 HC OR TIME LEV III 1ST 15 MIN: Performed by: OBSTETRICS & GYNECOLOGY

## 2022-10-20 PROCEDURE — 99900103 DSU ONLY-NO CHARGE-INITIAL HR (STAT): Performed by: OBSTETRICS & GYNECOLOGY

## 2022-10-20 PROCEDURE — 25000003 PHARM REV CODE 250: Performed by: ANESTHESIOLOGY

## 2022-10-20 PROCEDURE — 57260 CMBN ANT PST COLPRHY: CPT | Mod: ICN,,, | Performed by: OBSTETRICS & GYNECOLOGY

## 2022-10-20 PROCEDURE — 63600175 PHARM REV CODE 636 W HCPCS: Performed by: ANESTHESIOLOGY

## 2022-10-20 PROCEDURE — 57260 PR COMBINED ANT/POST COLPORRHAPHY: ICD-10-PCS | Mod: ICN,,, | Performed by: OBSTETRICS & GYNECOLOGY

## 2022-10-20 PROCEDURE — 25000003 PHARM REV CODE 250: Performed by: REGISTERED NURSE

## 2022-10-20 PROCEDURE — 63600175 PHARM REV CODE 636 W HCPCS: Performed by: REGISTERED NURSE

## 2022-10-20 PROCEDURE — 94761 N-INVAS EAR/PLS OXIMETRY MLT: CPT

## 2022-10-20 PROCEDURE — 36415 COLL VENOUS BLD VENIPUNCTURE: CPT | Performed by: OBSTETRICS & GYNECOLOGY

## 2022-10-20 PROCEDURE — 80048 BASIC METABOLIC PNL TOTAL CA: CPT | Performed by: OBSTETRICS & GYNECOLOGY

## 2022-10-20 PROCEDURE — 99900104 DSU ONLY-NO CHARGE-EA ADD'L HR (STAT): Performed by: OBSTETRICS & GYNECOLOGY

## 2022-10-20 PROCEDURE — 99900035 HC TECH TIME PER 15 MIN (STAT)

## 2022-10-20 RX ORDER — SODIUM CHLORIDE, SODIUM LACTATE, POTASSIUM CHLORIDE, CALCIUM CHLORIDE 600; 310; 30; 20 MG/100ML; MG/100ML; MG/100ML; MG/100ML
INJECTION, SOLUTION INTRAVENOUS CONTINUOUS
Status: DISCONTINUED | OUTPATIENT
Start: 2022-10-20 | End: 2022-10-21 | Stop reason: HOSPADM

## 2022-10-20 RX ORDER — MUPIROCIN 20 MG/G
OINTMENT TOPICAL
Status: DISCONTINUED | OUTPATIENT
Start: 2022-10-20 | End: 2022-10-20 | Stop reason: SDUPTHER

## 2022-10-20 RX ORDER — LIDOCAINE HYDROCHLORIDE 10 MG/ML
1 INJECTION, SOLUTION EPIDURAL; INFILTRATION; INTRACAUDAL; PERINEURAL ONCE
Status: DISCONTINUED | OUTPATIENT
Start: 2022-10-20 | End: 2022-10-20

## 2022-10-20 RX ORDER — MIDAZOLAM HYDROCHLORIDE 1 MG/ML
INJECTION INTRAMUSCULAR; INTRAVENOUS
Status: DISCONTINUED | OUTPATIENT
Start: 2022-10-20 | End: 2022-10-20

## 2022-10-20 RX ORDER — AMLODIPINE BESYLATE 5 MG/1
5 TABLET ORAL DAILY
Status: DISCONTINUED | OUTPATIENT
Start: 2022-10-20 | End: 2022-10-21 | Stop reason: HOSPADM

## 2022-10-20 RX ORDER — DIPHENHYDRAMINE HYDROCHLORIDE 50 MG/ML
25 INJECTION INTRAMUSCULAR; INTRAVENOUS EVERY 4 HOURS PRN
Status: DISCONTINUED | OUTPATIENT
Start: 2022-10-20 | End: 2022-10-21 | Stop reason: HOSPADM

## 2022-10-20 RX ORDER — KETOROLAC TROMETHAMINE 30 MG/ML
10 INJECTION, SOLUTION INTRAMUSCULAR; INTRAVENOUS ONCE
Status: COMPLETED | OUTPATIENT
Start: 2022-10-21 | End: 2022-10-20

## 2022-10-20 RX ORDER — CEFAZOLIN SODIUM 2 G/50ML
2 SOLUTION INTRAVENOUS
Status: DISCONTINUED | OUTPATIENT
Start: 2022-10-20 | End: 2022-10-20 | Stop reason: SDUPTHER

## 2022-10-20 RX ORDER — OXYCODONE AND ACETAMINOPHEN 5; 325 MG/1; MG/1
1 TABLET ORAL EVERY 4 HOURS PRN
Status: DISCONTINUED | OUTPATIENT
Start: 2022-10-20 | End: 2022-10-21 | Stop reason: HOSPADM

## 2022-10-20 RX ORDER — DEXAMETHASONE SODIUM PHOSPHATE 4 MG/ML
INJECTION, SOLUTION INTRA-ARTICULAR; INTRALESIONAL; INTRAMUSCULAR; INTRAVENOUS; SOFT TISSUE
Status: DISCONTINUED | OUTPATIENT
Start: 2022-10-20 | End: 2022-10-20

## 2022-10-20 RX ORDER — PROPOFOL 10 MG/ML
VIAL (ML) INTRAVENOUS
Status: DISCONTINUED | OUTPATIENT
Start: 2022-10-20 | End: 2022-10-20

## 2022-10-20 RX ORDER — PROCHLORPERAZINE EDISYLATE 5 MG/ML
5 INJECTION INTRAMUSCULAR; INTRAVENOUS EVERY 6 HOURS PRN
Status: DISCONTINUED | OUTPATIENT
Start: 2022-10-20 | End: 2022-10-21 | Stop reason: HOSPADM

## 2022-10-20 RX ORDER — MORPHINE SULFATE 4 MG/ML
4 INJECTION, SOLUTION INTRAMUSCULAR; INTRAVENOUS
Status: DISCONTINUED | OUTPATIENT
Start: 2022-10-20 | End: 2022-10-21 | Stop reason: HOSPADM

## 2022-10-20 RX ORDER — LIDOCAINE HYDROCHLORIDE 20 MG/ML
JELLY TOPICAL
Status: DISCONTINUED | OUTPATIENT
Start: 2022-10-20 | End: 2022-10-20 | Stop reason: HOSPADM

## 2022-10-20 RX ORDER — LISINOPRIL 2.5 MG/1
10 TABLET ORAL DAILY
Status: DISCONTINUED | OUTPATIENT
Start: 2022-10-20 | End: 2022-10-21 | Stop reason: HOSPADM

## 2022-10-20 RX ORDER — KETAMINE HYDROCHLORIDE 100 MG/ML
INJECTION, SOLUTION INTRAMUSCULAR; INTRAVENOUS
Status: DISCONTINUED | OUTPATIENT
Start: 2022-10-20 | End: 2022-10-20

## 2022-10-20 RX ORDER — CEFAZOLIN SODIUM 2 G/50ML
2 SOLUTION INTRAVENOUS
Status: DISCONTINUED | OUTPATIENT
Start: 2022-10-20 | End: 2022-10-20

## 2022-10-20 RX ORDER — IBUPROFEN 600 MG/1
600 TABLET ORAL EVERY 6 HOURS
Status: DISCONTINUED | OUTPATIENT
Start: 2022-10-21 | End: 2022-10-21 | Stop reason: HOSPADM

## 2022-10-20 RX ORDER — VANCOMYCIN HCL IN 5 % DEXTROSE 1G/250ML
1000 PLASTIC BAG, INJECTION (ML) INTRAVENOUS
Status: COMPLETED | OUTPATIENT
Start: 2022-10-20 | End: 2022-10-20

## 2022-10-20 RX ORDER — ONDANSETRON 2 MG/ML
4 INJECTION INTRAMUSCULAR; INTRAVENOUS ONCE
Status: COMPLETED | OUTPATIENT
Start: 2022-10-20 | End: 2022-10-20

## 2022-10-20 RX ORDER — ACETAMINOPHEN 10 MG/ML
INJECTION, SOLUTION INTRAVENOUS
Status: DISCONTINUED | OUTPATIENT
Start: 2022-10-20 | End: 2022-10-20

## 2022-10-20 RX ORDER — ONDANSETRON 8 MG/1
8 TABLET, ORALLY DISINTEGRATING ORAL EVERY 8 HOURS PRN
Status: DISCONTINUED | OUTPATIENT
Start: 2022-10-20 | End: 2022-10-21 | Stop reason: HOSPADM

## 2022-10-20 RX ORDER — OXYCODONE HYDROCHLORIDE 5 MG/1
5 TABLET ORAL ONCE AS NEEDED
Status: COMPLETED | OUTPATIENT
Start: 2022-10-20 | End: 2022-10-20

## 2022-10-20 RX ORDER — FENTANYL CITRATE 50 UG/ML
25 INJECTION, SOLUTION INTRAMUSCULAR; INTRAVENOUS EVERY 5 MIN PRN
Status: DISCONTINUED | OUTPATIENT
Start: 2022-10-20 | End: 2023-04-11

## 2022-10-20 RX ORDER — ROCURONIUM BROMIDE 10 MG/ML
INJECTION, SOLUTION INTRAVENOUS
Status: DISCONTINUED | OUTPATIENT
Start: 2022-10-20 | End: 2022-10-20

## 2022-10-20 RX ORDER — ASPIRIN 81 MG/1
81 TABLET ORAL ONCE
Status: COMPLETED | OUTPATIENT
Start: 2022-10-20 | End: 2022-10-20

## 2022-10-20 RX ORDER — MUPIROCIN 20 MG/G
OINTMENT TOPICAL
Status: DISCONTINUED | OUTPATIENT
Start: 2022-10-20 | End: 2022-10-20 | Stop reason: HOSPADM

## 2022-10-20 RX ORDER — LIDOCAINE HYDROCHLORIDE 20 MG/ML
INJECTION INTRAVENOUS
Status: DISCONTINUED | OUTPATIENT
Start: 2022-10-20 | End: 2022-10-20

## 2022-10-20 RX ORDER — PHENYLEPHRINE HYDROCHLORIDE 10 MG/ML
INJECTION INTRAVENOUS
Status: DISCONTINUED | OUTPATIENT
Start: 2022-10-20 | End: 2022-10-20

## 2022-10-20 RX ORDER — DOCUSATE SODIUM 100 MG/1
100 CAPSULE, LIQUID FILLED ORAL 2 TIMES DAILY
Status: DISCONTINUED | OUTPATIENT
Start: 2022-10-20 | End: 2022-10-21 | Stop reason: HOSPADM

## 2022-10-20 RX ORDER — ONDANSETRON HYDROCHLORIDE 2 MG/ML
INJECTION, SOLUTION INTRAMUSCULAR; INTRAVENOUS
Status: DISCONTINUED | OUTPATIENT
Start: 2022-10-20 | End: 2022-10-20

## 2022-10-20 RX ORDER — BUPIVACAINE HYDROCHLORIDE AND EPINEPHRINE 5; 5 MG/ML; UG/ML
INJECTION, SOLUTION EPIDURAL; INTRACAUDAL; PERINEURAL
Status: DISCONTINUED | OUTPATIENT
Start: 2022-10-20 | End: 2022-10-20 | Stop reason: HOSPADM

## 2022-10-20 RX ORDER — SUCCINYLCHOLINE CHLORIDE 20 MG/ML
INJECTION INTRAMUSCULAR; INTRAVENOUS
Status: DISCONTINUED | OUTPATIENT
Start: 2022-10-20 | End: 2022-10-20

## 2022-10-20 RX ORDER — EPHEDRINE SULFATE 50 MG/ML
INJECTION, SOLUTION INTRAVENOUS
Status: DISCONTINUED | OUTPATIENT
Start: 2022-10-20 | End: 2022-10-20

## 2022-10-20 RX ORDER — FENTANYL CITRATE 50 UG/ML
INJECTION, SOLUTION INTRAMUSCULAR; INTRAVENOUS
Status: DISCONTINUED | OUTPATIENT
Start: 2022-10-20 | End: 2022-10-20

## 2022-10-20 RX ORDER — MUPIROCIN 20 MG/G
OINTMENT TOPICAL 2 TIMES DAILY
Status: DISCONTINUED | OUTPATIENT
Start: 2022-10-20 | End: 2022-10-21 | Stop reason: HOSPADM

## 2022-10-20 RX ADMIN — PHENYLEPHRINE HYDROCHLORIDE 16 MCG/MIN: 10 INJECTION INTRAVENOUS at 07:10

## 2022-10-20 RX ADMIN — ROCURONIUM BROMIDE 5 MG: 10 INJECTION, SOLUTION INTRAVENOUS at 07:10

## 2022-10-20 RX ADMIN — ONDANSETRON 4 MG: 2 INJECTION INTRAMUSCULAR; INTRAVENOUS at 11:10

## 2022-10-20 RX ADMIN — KETAMINE HYDROCHLORIDE 30 MG: 100 INJECTION, SOLUTION, CONCENTRATE INTRAMUSCULAR; INTRAVENOUS at 07:10

## 2022-10-20 RX ADMIN — LIDOCAINE HYDROCHLORIDE 50 MG: 20 INJECTION, SOLUTION INTRAVENOUS at 07:10

## 2022-10-20 RX ADMIN — EPHEDRINE SULFATE 10 MG: 50 INJECTION, SOLUTION INTRAMUSCULAR; INTRAVENOUS; SUBCUTANEOUS at 08:10

## 2022-10-20 RX ADMIN — PHENYLEPHRINE HYDROCHLORIDE 100 MCG: 10 INJECTION INTRAVENOUS at 08:10

## 2022-10-20 RX ADMIN — KETOROLAC TROMETHAMINE 10 MG: 30 INJECTION, SOLUTION INTRAMUSCULAR; INTRAVENOUS at 11:10

## 2022-10-20 RX ADMIN — FENTANYL CITRATE 25 MCG: 50 INJECTION, SOLUTION INTRAMUSCULAR; INTRAVENOUS at 08:10

## 2022-10-20 RX ADMIN — SODIUM CHLORIDE, SODIUM LACTATE, POTASSIUM CHLORIDE, AND CALCIUM CHLORIDE: .6; .31; .03; .02 INJECTION, SOLUTION INTRAVENOUS at 10:10

## 2022-10-20 RX ADMIN — PROPOFOL 100 MG: 10 INJECTION, EMULSION INTRAVENOUS at 07:10

## 2022-10-20 RX ADMIN — MUPIROCIN: 20 OINTMENT TOPICAL at 08:10

## 2022-10-20 RX ADMIN — SODIUM CHLORIDE, SODIUM GLUCONATE, SODIUM ACETATE, POTASSIUM CHLORIDE, MAGNESIUM CHLORIDE, SODIUM PHOSPHATE, DIBASIC, AND POTASSIUM PHOSPHATE: .53; .5; .37; .037; .03; .012; .00082 INJECTION, SOLUTION INTRAVENOUS at 06:10

## 2022-10-20 RX ADMIN — PROCHLORPERAZINE EDISYLATE 5 MG: 5 INJECTION INTRAMUSCULAR; INTRAVENOUS at 11:10

## 2022-10-20 RX ADMIN — VANCOMYCIN HYDROCHLORIDE 1000 MG: 1 INJECTION, POWDER, LYOPHILIZED, FOR SOLUTION INTRAVENOUS at 07:10

## 2022-10-20 RX ADMIN — DOCUSATE SODIUM 100 MG: 100 CAPSULE, LIQUID FILLED ORAL at 01:10

## 2022-10-20 RX ADMIN — SODIUM CHLORIDE, SODIUM LACTATE, POTASSIUM CHLORIDE, AND CALCIUM CHLORIDE: .6; .31; .03; .02 INJECTION, SOLUTION INTRAVENOUS at 08:10

## 2022-10-20 RX ADMIN — PHENYLEPHRINE HYDROCHLORIDE 100 MCG: 10 INJECTION INTRAVENOUS at 07:10

## 2022-10-20 RX ADMIN — DEXAMETHASONE SODIUM PHOSPHATE 4 MG: 4 INJECTION, SOLUTION INTRA-ARTICULAR; INTRALESIONAL; INTRAMUSCULAR; INTRAVENOUS; SOFT TISSUE at 07:10

## 2022-10-20 RX ADMIN — FENTANYL CITRATE 25 MCG: 50 INJECTION, SOLUTION INTRAMUSCULAR; INTRAVENOUS at 12:10

## 2022-10-20 RX ADMIN — PROPOFOL 30 MG: 10 INJECTION, EMULSION INTRAVENOUS at 08:10

## 2022-10-20 RX ADMIN — OXYCODONE 5 MG: 5 TABLET ORAL at 11:10

## 2022-10-20 RX ADMIN — SODIUM CHLORIDE, SODIUM GLUCONATE, SODIUM ACETATE, POTASSIUM CHLORIDE, MAGNESIUM CHLORIDE, SODIUM PHOSPHATE, DIBASIC, AND POTASSIUM PHOSPHATE: .53; .5; .37; .037; .03; .012; .00082 INJECTION, SOLUTION INTRAVENOUS at 08:10

## 2022-10-20 RX ADMIN — OXYCODONE HYDROCHLORIDE AND ACETAMINOPHEN 1 TABLET: 5; 325 TABLET ORAL at 01:10

## 2022-10-20 RX ADMIN — FENTANYL CITRATE 25 MCG: 50 INJECTION, SOLUTION INTRAMUSCULAR; INTRAVENOUS at 11:10

## 2022-10-20 RX ADMIN — MORPHINE SULFATE 4 MG: 4 INJECTION INTRAVENOUS at 02:10

## 2022-10-20 RX ADMIN — KETAMINE HYDROCHLORIDE 10 MG: 100 INJECTION, SOLUTION, CONCENTRATE INTRAMUSCULAR; INTRAVENOUS at 08:10

## 2022-10-20 RX ADMIN — FENTANYL CITRATE 50 MCG: 50 INJECTION, SOLUTION INTRAMUSCULAR; INTRAVENOUS at 07:10

## 2022-10-20 RX ADMIN — MIDAZOLAM HYDROCHLORIDE 1 MG: 1 INJECTION, SOLUTION INTRAMUSCULAR; INTRAVENOUS at 07:10

## 2022-10-20 RX ADMIN — ASPIRIN 81 MG: 81 TABLET, COATED ORAL at 01:10

## 2022-10-20 RX ADMIN — ONDANSETRON 4 MG: 2 INJECTION, SOLUTION INTRAMUSCULAR; INTRAVENOUS at 08:10

## 2022-10-20 RX ADMIN — GLYCOPYRROLATE 0.2 MG: 0.2 INJECTION, SOLUTION INTRAMUSCULAR; INTRAVITREAL at 07:10

## 2022-10-20 RX ADMIN — SUCCINYLCHOLINE CHLORIDE 120 MG: 20 INJECTION, SOLUTION INTRAMUSCULAR; INTRAVENOUS; PARENTERAL at 07:10

## 2022-10-20 RX ADMIN — EPHEDRINE SULFATE 5 MG: 50 INJECTION, SOLUTION INTRAMUSCULAR; INTRAVENOUS; SUBCUTANEOUS at 09:10

## 2022-10-20 RX ADMIN — PROCHLORPERAZINE EDISYLATE 5 MG: 5 INJECTION INTRAMUSCULAR; INTRAVENOUS at 03:10

## 2022-10-20 RX ADMIN — DOCUSATE SODIUM 100 MG: 100 CAPSULE, LIQUID FILLED ORAL at 08:10

## 2022-10-20 RX ADMIN — MUPIROCIN: 20 OINTMENT TOPICAL at 06:10

## 2022-10-20 RX ADMIN — ACETAMINOPHEN 1000 MG: 10 INJECTION, SOLUTION INTRAVENOUS at 08:10

## 2022-10-20 NOTE — ANESTHESIA PROCEDURE NOTES
Intubation    Date/Time: 10/20/2022 7:56 AM  Performed by: Mitch Trejo CRNA  Authorized by: Geovani Levy MD     Intubation:     Induction:  Intravenous    Intubated:  Postinduction    Mask Ventilation:  Easy with oral airway    Attempts:  1    Attempted By:  CRNA    Method of Intubation:  Direct    Blade:  Salazar 2    Laryngeal View Grade: Grade I - full view of cords      Difficult Airway Encountered?: No      Complications:  None    Airway Device:  Oral endotracheal tube    Airway Device Size:  7.0    Style/Cuff Inflation:  Cuffed    Tube secured:  22    Secured at:  The lips    Placement Verified By:  Capnometry    Complicating Factors:  None    Findings Post-Intubation:  BS equal bilateral

## 2022-10-20 NOTE — CARE UPDATE
10/20/22 1530   Patient Assessment/Suction   Level of Consciousness (AVPU) alert   Rhythm/Pattern, Respiratory unlabored   PRE-TX-O2   O2 Device (Oxygen Therapy) nasal cannula   $ Is the patient on Low Flow Oxygen? Yes   Flow (L/min) 2   SpO2 98 %   Pulse Oximetry Type Intermittent   $ Pulse Oximetry - Multiple Charge Pulse Oximetry - Multiple   Incentive Spirometer   $ Incentive Spirometer Charges postop instruction   Administration (IS) instruction provided, follow-up   Number of Repetitions (IS) 8   Level Incentive Spirometer (mL) 1000   Patient Tolerance (IS) good;no adverse signs/symptoms present

## 2022-10-20 NOTE — BRIEF OP NOTE
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       Estimated Blood Loss has been documented.  Specimens: none  Specimen (24h ago, onward)      None            [id number]

## 2022-10-20 NOTE — PLAN OF CARE
POC/Meds reviewed, pt verbalized understanding. Vitals stable. Afebrile.  IV fluids infusing per order. Tele In place-NSR. SCD's on. IS at bedside, instructed on use and return demonstration performed. Pt with episodes of nausea with pain medication, PRN nausea medication providing slight relief. Reposistions self. Hourly/Q2hr rounding performed, safety maintained. Bed in lowest position, wheels locked, SR up x2, call light in easy reach. No  complaints at this time. Will continue to monitor.    Packing with slight dried drainage noted.

## 2022-10-20 NOTE — ASSESSMENT & PLAN NOTE
Patient is chronically on statin.will continue for now. Monitor clinically. Last LDL was   Lab Results   Component Value Date    LDLCALC 150.8 04/20/2021

## 2022-10-20 NOTE — ANESTHESIA PREPROCEDURE EVALUATION
10/20/2022  Yue Chatman is a 77 y.o., female.      Pre-op Assessment    I have reviewed the Patient Summary Reports.     I have reviewed the Nursing Notes. I have reviewed the NPO Status.   I have reviewed the Medications.     Review of Systems  Anesthesia Hx:  No problems with previous Anesthesia    Social:  Non-Smoker    Cardiovascular:   Pacemaker Hypertension, well controlled CAD      Pulmonary:  Pulmonary Normal    Renal/:   Chronic Renal Disease renal calculi    Neurological:   Neuromuscular Disease,    Endocrine:   Diabetes, well controlled  Obesity / BMI > 30, Morbid Obesity / BMI > 40      Physical Exam  General: Well nourished, Cooperative, Alert and Oriented    Airway:  Mallampati: II   Mouth Opening: Normal  TM Distance: Normal  Neck ROM: Normal ROM    Dental:  Dentures        Anesthesia Plan  Type of Anesthesia, risks & benefits discussed:    Anesthesia Type: Gen ETT, Gen Supraglottic Airway, Gen Natural Airway, MAC  Intra-op Monitoring Plan: Standard ASA Monitors  Post Op Pain Control Plan: multimodal analgesia  Induction:  IV  Airway Plan: Direct, Video and Fiberoptic, Post-Induction  Informed Consent: Informed consent signed with the Patient and all parties understand the risks and agree with anesthesia plan.  All questions answered.   ASA Score: 3    Ready For Surgery From Anesthesia Perspective.     .

## 2022-10-20 NOTE — CONSULTS
[facility] Hospital Medicine  Consult Note    Patient Name: Yue Chatman  MRN: [id number]  Admission Date: 10/20/2022  Hospital Length of Stay: 0 days  Attending Physician: Geovani Keenan MD   Primary Care Provider: Jose Montgomery MD           Patient information was obtained from patient, past medical records, and ER records.     Inpatient consult to Hospitalist  Consult performed by: Toshia Hernandes PA-C  Consult ordered by: Geovani Keenan MD        Subjective:     Principal Problem: Vaginal vault prolapse    Chief Complaint: No chief complaint on file.       HPI: Yue Chatman is a 77 y.o. y.o. female with a PMHx of hypertension, obesity, pacemaker, glucose intolerance who is s/p  colporrhaphy combined anteroposterior with enterocele repair with Dr. Keenan 10/20.  Patient was seen by Dr. Keenan clinic on 10/11.  Postoperatively, patient reports mild pain and nausea.  She denies chest pain, shortness a breath, cough and vomiting.  She reports compliance with Bactrim prior to surgery as previously discussed with Dr. Keenan. Reviewed preop labs. Hospital Medicine was consulted for further workup and management of the patient.         Past Medical History:   Diagnosis Date    Acquired female bladder prolapse     Borderline diabetes     Cataract     Coronary artery disease 2009    NEGATIVE CARDIAC ANGIOGRAM PER PATIENT    Glucose intolerance (impaired glucose tolerance)     Hypertension     Nephrolithiasis     Obesity     Osteoporosis     Wears partial dentures     UPPERS       Past Surgical History:   Procedure Laterality Date    APPENDECTOMY  1976    BLADDER SUSPENSION      CATARACT EXTRACTION, BILATERAL      CHOLECYSTECTOMY      COLONOSCOPY  2009; 2016    GALLBLADDER SURGERY      HERNIA REPAIR  11/2012    VENTRAL     HYSTERECTOMY  1976    INCISIONAL HERNIA REPAIR  04/06/2018    Dr. ANSELMO Castle, [facility]     INSERTION OF PACEMAKER N/A 08/08/2022    renal stone removal         Review of  patient's allergies indicates:   Allergen Reactions    Cephalexin Swelling    Crestor [rosuvastatin] Other (See Comments)     MUSCLE CRAMPS, ANNE LEGS UP    Hydrocodone Nausea And Vomiting    Azithromycin Itching     SWELLING    Ciprofloxacin Other (See Comments)     bothers her knee    Erythromycin Itching and Swelling       No current facility-administered medications on file prior to encounter.     Current Outpatient Medications on File Prior to Encounter   Medication Sig    amLODIPine (NORVASC) 5 MG tablet Take 1 tablet (5 mg total) by mouth once daily. (Patient taking differently: Take 5 mg by mouth every evening.)    ramipriL (ALTACE) 10 MG capsule Take 1 capsule (10 mg total) by mouth once daily.    TRUE METRIX GLUCOSE METER Misc USE AS DIRECTED    aspirin (ECOTRIN) 81 MG EC tablet aspirin 81 mg tablet,delayed release   Take 1 tablet every day by oral route as directed for 90 days.    blood sugar diagnostic (TRUE METRIX GLUCOSE TEST STRIP) Strp USE TO TEST TWICE A DAY    lancets (ONETOUCH DELICA LANCETS) 33 gauge Misc 1 lancet by Misc.(Non-Drug; Combo Route) route 2 (two) times a day.    [DISCONTINUED] trimethoprim (TRIMPEX) 100 mg Tab Take 1 tablet (100 mg total) by mouth every 12 (twelve) hours. Start 3 days prior to surgery for 3 days     Family History       Problem Relation (Age of Onset)    Arthritis Sister    COPD Sister    Hypertension Mother    Osteoporosis Sister    Thyroid disease Sister          Tobacco Use    Smoking status: Never    Smokeless tobacco: Never   Substance and Sexual Activity    Alcohol use: No    Drug use: No    Sexual activity: Not on file     Review of Systems   Constitutional:  Negative for fatigue and fever.   HENT:  Negative for congestion and sore throat.    Respiratory:  Negative for cough, shortness of breath and wheezing.    Cardiovascular:  Negative for chest pain, palpitations and leg swelling.   Gastrointestinal:  Positive for abdominal pain (lower) and nausea. Negative  for diarrhea and vomiting.   Genitourinary:  Negative for difficulty urinating and dysuria.   Skin:  Positive for wound.   Neurological:  Negative for dizziness, light-headedness and headaches.   Objective:     Vital Signs (Most Recent):  Temp: 97.7 °F (36.5 °C) (10/20/22 1140)  Pulse: 80 (10/20/22 1145)  Resp: 20 (10/20/22 1130)  BP: 138/62 (10/20/22 1145)  SpO2: 96 % (10/20/22 1145) Vital Signs (24h Range):  Temp:  [97.7 °F (36.5 °C)-98.1 °F (36.7 °C)] 97.7 °F (36.5 °C)  Pulse:  [] 80  Resp:  [11-25] 20  SpO2:  [95 %-99 %] 96 %  BP: (115-160)/(56-68) 138/62     Weight: 78 kg (172 lb)  Body mass index is 30.47 kg/m².    Physical Exam  Vitals and nursing note reviewed.   Constitutional:       General: She is not in acute distress.     Appearance: Normal appearance. She is normal weight.   HENT:      Head: Normocephalic and atraumatic.      Mouth/Throat:      Mouth: Mucous membranes are moist.      Pharynx: Oropharynx is clear.   Eyes:      Extraocular Movements: Extraocular movements intact.      Pupils: Pupils are equal, round, and reactive to light.   Cardiovascular:      Rate and Rhythm: Normal rate and regular rhythm.      Pulses: Normal pulses.      Heart sounds: Normal heart sounds.   Pulmonary:      Effort: Pulmonary effort is normal.      Breath sounds: Normal breath sounds.   Abdominal:      General: Abdomen is flat. Bowel sounds are normal. There is no distension.      Palpations: Abdomen is soft.      Tenderness: There is no abdominal tenderness. There is no guarding or rebound.   Musculoskeletal:         General: Normal range of motion.      Cervical back: Normal range of motion and neck supple.   Skin:     General: Skin is warm.      Capillary Refill: Capillary refill takes 2 to 3 seconds.   Neurological:      General: No focal deficit present.      Mental Status: She is alert and oriented to person, place, and time. Mental status is at baseline.   Psychiatric:         Mood and Affect: Mood  normal.         Behavior: Behavior normal.       Significant Labs: All pertinent labs within the past 24 hours have been reviewed.      CMP:   Recent Labs   Lab 10/20/22  0557      K 4.6      CO2 19*   *   BUN 14   CREATININE 1.2   CALCIUM 9.6   ANIONGAP 13       Significant Imaging: I have reviewed all pertinent imaging results/findings within the past 24 hours.    Assessment/Plan:     * Vaginal vault prolapse  -S/p colporrhaphy combined anteroposterior with enterocele repair with Dr. Keenan 10/20  - p.r.n. pain medication per surgery recommendations   -p.r.n. antiemetics   -aggressive IS   -monitor hemoglobin hematocrit closely       Hyperlipidemia   Patient is chronically on statin.will continue for now. Monitor clinically. Last LDL was   Lab Results   Component Value Date    LDLCALC 150.8 04/20/2021            Glucose intolerance (impaired glucose tolerance)  Noted.   - POCT glucose order  - SSI ordered      Hypertension  Chronic, controlled.  Latest blood pressure and vitals reviewed-   Temp:  [97.7 °F (36.5 °C)-98.1 °F (36.7 °C)]   Pulse:  []   Resp:  [11-25]   BP: (115-160)/(56-72)   SpO2:  [95 %-99 %] .   Home meds for hypertension were reviewed and noted below.   Hypertension Medications               amLODIPine (NORVASC) 5 MG tablet Take 1 tablet (5 mg total) by mouth once daily.    ramipriL (ALTACE) 10 MG capsule Take 1 capsule (10 mg total) by mouth once daily.            While in the hospital, will manage blood pressure as follows; Continue home antihypertensive regimen    Will utilize p.r.n. blood pressure medication only if patient's blood pressure greater than  180/110 and she develops symptoms such as worsening chest pain or shortness of breath.          VTE Risk Mitigation (From admission, onward)           Ordered     IP VTE HIGH RISK PATIENT  Once         10/20/22 1236     Place sequential compression device  Until discontinued         10/20/22 1236                         Thank you for your consult. I will follow-up with patient. Please contact us if you have any additional questions.    Toshia Hernandes PA-C  Department of Hospital Medicine   [facility]

## 2022-10-20 NOTE — OP NOTE
Surgeon;VIRAL kay    Procedure;APR,enterocele repair, partial colpocleisis, suprapubic catheterization    Preop diagnosis; cystocele with rectocele, enterocele, vaginal vault prolapse    Postop diagnosis; same    Specimen; none    Blood loss; 50 mL    Complications; none    Under general anesthesia the patient was prepped and draped in dorsal lithotomy position in [facility].  She had total vaginal vault prolapse with a small shallow ulceration anteriorly.  After local was injected, a strip of vaginal mucosa was excised anteriorly with widening at the apex at the level cuff the polypropylene mesh implant was noted we dissected around this to leave it intact.  Posterior dissection that was was performed in the same fashion as anteriorly.  This essentially denuded full length of the vagina anterior to posterior about 3-4 cm wide at the apex and narrowed distally.  Suburethral plication was performed and the cystocele was repaired in layers with 2-0 Vicryl suture.  Same thing was performed posteriorly with the rectocele.  Apical colpocleisis was performed with the for suture row closure with 2-0 Vicryl sutures with 2-3 layers giving good apical closure but saving the lumen of the distal vagina.  The vaginal mucosa was then closed with 2-0 Vicryl suture with a good anatomic result in good hemostasis.  The lateral sulci were somewhat shallow but distinct.  Vaginal pack was inserted.  Rectal exam was confirmatory.  Cystoscopy was performed and suprapubic catheter was placed by trocar technique.  Both ureters function normally.  She there was slight increased vascularity in the bladder and some minimal trabeculation but otherwise lumen was normal.  The suprapubic catheter was sutured in place with nylon.  Urine was clear.  Patient was awakened in the operating room and sent to recovery in stable condition.  All needle lap and instrument counts were correct.

## 2022-10-20 NOTE — PLAN OF CARE
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]  Enterocele [K46.9]    Admission Date: 10/20/2022  Expected Discharge Date: 10/21/2022    Discharge Barriers Identified: None    Payor: [employer] MANAGED MEDICARE / Plan: [employer] MEDICARE HMO / Product Type: Capitation /     Extended Emergency Contact Information  Primary Emergency Contact: Trae Chatman  Address: [address] [place] [address] [id number] [place] [address]  Home Phone: [phone]  Mobile Phone: [phone]  Relation: Spouse  Secondary Emergency Contact: FavioMandeep lindsayi   [place]  Mobile Phone: [phone]  Relation: Daughter    Discharge Plan A: Home  Discharge Plan B: Home with family      [id number] [place], [place] - [address] [address]  Phone: [phone] Fax: [phone]    SW met with patient and patient's spouse Trae Chatman at bedside to complete discharge planning assessment.  Patient alert and oriented xs 4.  Patient verified all demographic information on facesheet is correct.  Patient verified PCP is Dr. Montgomery.  Patient verified primary health insurance is [employer].  Patient with NO home health or DME.  Patient family will provide transportation upon discharge from facility.  Patient independent with ADLs, live with spouse, spouse drives.      Initial Assessment (most recent)       Adult Discharge Assessment - 10/20/22 1526          Discharge Assessment    Assessment Type Discharge Planning Assessment     Confirmed/corrected address, phone number and insurance Yes     Confirmed Demographics Correct on Facesheet     Source of Information patient;family     Does patient/caregiver understand observation status Yes     Lives With spouse     Facility Arrived From: home     Do you expect to return to your current living  situation? Yes     Do you have help at home or someone to help you manage your care at home? Yes     Who are your caregiver(s) and their phone number(s)? spouse     Prior to hospitilization cognitive status: Alert/Oriented     Current cognitive status: Alert/Oriented     Walking or Climbing Stairs Difficulty none     Dressing/Bathing Difficulty none     Equipment Currently Used at Home none     Readmission within 30 days? No     Patient currently being followed by outpatient case management? No     Do you currently have service(s) that help you manage your care at home? No     Do you take prescription medications? Yes     Do you have prescription coverage? Yes     Do you have any problems affording any of your prescribed medications? No     Is the patient taking medications as prescribed? yes     Who is going to help you get home at discharge? spouse     How do you get to doctors appointments? family or friend will provide     Are you on dialysis? No     Do you take coumadin? No     Discharge Plan A Home     Discharge Plan B Home with family     DME Needed Upon Discharge  none     Discharge Plan discussed with: Patient;Spouse/sig other     Discharge Barriers Identified None

## 2022-10-20 NOTE — SUBJECTIVE & OBJECTIVE
Past Medical History:   Diagnosis Date    Acquired female bladder prolapse     Borderline diabetes     Cataract     Coronary artery disease 2009    NEGATIVE CARDIAC ANGIOGRAM PER PATIENT    Glucose intolerance (impaired glucose tolerance)     Hypertension     Nephrolithiasis     Obesity     Osteoporosis     Wears partial dentures     UPPERS       Past Surgical History:   Procedure Laterality Date    APPENDECTOMY  1976    BLADDER SUSPENSION      CATARACT EXTRACTION, BILATERAL      CHOLECYSTECTOMY      COLONOSCOPY  2009; 2016    GALLBLADDER SURGERY      HERNIA REPAIR  11/2012    VENTRAL     HYSTERECTOMY  1976    INCISIONAL HERNIA REPAIR  04/06/2018    Dr. ANSELMO Castle, [facility]     INSERTION OF PACEMAKER N/A 08/08/2022    renal stone removal         Review of patient's allergies indicates:   Allergen Reactions    Cephalexin Swelling    Crestor [rosuvastatin] Other (See Comments)     MUSCLE CRAMPS, ANNE LEGS UP    Hydrocodone Nausea And Vomiting    Azithromycin Itching     SWELLING    Ciprofloxacin Other (See Comments)     bothers her knee    Erythromycin Itching and Swelling       No current facility-administered medications on file prior to encounter.     Current Outpatient Medications on File Prior to Encounter   Medication Sig    amLODIPine (NORVASC) 5 MG tablet Take 1 tablet (5 mg total) by mouth once daily. (Patient taking differently: Take 5 mg by mouth every evening.)    ramipriL (ALTACE) 10 MG capsule Take 1 capsule (10 mg total) by mouth once daily.    TRUE METRIX GLUCOSE METER Misc USE AS DIRECTED    aspirin (ECOTRIN) 81 MG EC tablet aspirin 81 mg tablet,delayed release   Take 1 tablet every day by oral route as directed for 90 days.    blood sugar diagnostic (TRUE METRIX GLUCOSE TEST STRIP) Strp USE TO TEST TWICE A DAY    lancets (ONETOUCH DELICA LANCETS) 33 gauge Misc 1 lancet by Misc.(Non-Drug; Combo Route) route 2 (two) times a day.    [DISCONTINUED] trimethoprim (TRIMPEX)  100 mg Tab Take 1 tablet (100 mg total) by mouth every 12 (twelve) hours. Start 3 days prior to surgery for 3 days     Family History       Problem Relation (Age of Onset)    Arthritis Sister    COPD Sister    Hypertension Mother    Osteoporosis Sister    Thyroid disease Sister          Tobacco Use    Smoking status: Never    Smokeless tobacco: Never   Substance and Sexual Activity    Alcohol use: No    Drug use: No    Sexual activity: Not on file     Review of Systems   Constitutional:  Negative for fatigue and fever.   HENT:  Negative for congestion and sore throat.    Respiratory:  Negative for cough, shortness of breath and wheezing.    Cardiovascular:  Negative for chest pain, palpitations and leg swelling.   Gastrointestinal:  Positive for abdominal pain (lower) and nausea. Negative for diarrhea and vomiting.   Genitourinary:  Negative for difficulty urinating and dysuria.   Skin:  Positive for wound.   Neurological:  Negative for dizziness, light-headedness and headaches.   Objective:     Vital Signs (Most Recent):  Temp: 97.7 °F (36.5 °C) (10/20/22 1140)  Pulse: 80 (10/20/22 1145)  Resp: 20 (10/20/22 1130)  BP: 138/62 (10/20/22 1145)  SpO2: 96 % (10/20/22 1145) Vital Signs (24h Range):  Temp:  [97.7 °F (36.5 °C)-98.1 °F (36.7 °C)] 97.7 °F (36.5 °C)  Pulse:  [] 80  Resp:  [11-25] 20  SpO2:  [95 %-99 %] 96 %  BP: (115-160)/(56-68) 138/62     Weight: 78 kg (172 lb)  Body mass index is 30.47 kg/m².    Physical Exam  Vitals and nursing note reviewed.   Constitutional:       General: She is not in acute distress.     Appearance: Normal appearance. She is normal weight.   HENT:      Head: Normocephalic and atraumatic.      Mouth/Throat:      Mouth: Mucous membranes are moist.      Pharynx: Oropharynx is clear.   Eyes:      Extraocular Movements: Extraocular movements intact.      Pupils: Pupils are equal, round, and reactive to light.   Cardiovascular:      Rate and Rhythm: Normal rate and regular  rhythm.      Pulses: Normal pulses.      Heart sounds: Normal heart sounds.   Pulmonary:      Effort: Pulmonary effort is normal.      Breath sounds: Normal breath sounds.   Abdominal:      General: Abdomen is flat. Bowel sounds are normal. There is no distension.      Palpations: Abdomen is soft.      Tenderness: There is no abdominal tenderness. There is no guarding or rebound.   Musculoskeletal:         General: Normal range of motion.      Cervical back: Normal range of motion and neck supple.   Skin:     General: Skin is warm.      Capillary Refill: Capillary refill takes 2 to 3 seconds.   Neurological:      General: No focal deficit present.      Mental Status: She is alert and oriented to person, place, and time. Mental status is at baseline.   Psychiatric:         Mood and Affect: Mood normal.         Behavior: Behavior normal.       Significant Labs: All pertinent labs within the past 24 hours have been reviewed.      CMP:   Recent Labs   Lab 10/20/22  0557      K 4.6      CO2 19*   *   BUN 14   CREATININE 1.2   CALCIUM 9.6   ANIONGAP 13       Significant Imaging: I have reviewed all pertinent imaging results/findings within the past 24 hours.

## 2022-10-20 NOTE — PLAN OF CARE
VSS. NAD. Resp E/U. Calm and cooperative. Speech appropriate. Tolerating clear liquids. Denies nausea. Pain controlled. IV patent. No swelling or redness. Dressing to lower abdomen CDI. Suprapubic catheter intact and draining well. Family notified of patient status. All safety measures taken. Bed in low position. Bedrails up x2. Bed locked.

## 2022-10-20 NOTE — TRANSFER OF CARE
"Anesthesia Transfer of Care Note    Patient: Yue Chatman    Procedure(s) Performed: Procedure(s) (LRB):  COLPORRHAPHY, COMBINED ANTEROPOSTERIOR (N/A)  REPAIR, ENTEROCELE (N/A)  COLPOCLEISIS (N/A)    Patient location: PACU    Anesthesia Type: general    Transport from OR: Transported from OR on 6-10 L/min O2 by face mask with adequate spontaneous ventilation    Post pain: adequate analgesia    Post assessment: tolerated procedure well and no apparent anesthetic complications    Post vital signs: stable    Level of consciousness: sedated    Nausea/Vomiting: no nausea/vomiting    Complications: none    Transfer of care protocol was followed      Last vitals:   Visit Vitals  BP (!) 115/57   Pulse 85   Temp 36.7 °C (98.1 °F) (Skin)   Resp 12   Ht 5' 3" (1.6 m)   Wt 78 kg (172 lb)   SpO2 96%   Breastfeeding No   BMI 30.47 kg/m²     "

## 2022-10-20 NOTE — ASSESSMENT & PLAN NOTE
Chronic, controlled.  Latest blood pressure and vitals reviewed-   Temp:  [97.7 °F (36.5 °C)-98.1 °F (36.7 °C)]   Pulse:  []   Resp:  [11-25]   BP: (115-160)/(56-72)   SpO2:  [95 %-99 %] .   Home meds for hypertension were reviewed and noted below.   Hypertension Medications             amLODIPine (NORVASC) 5 MG tablet Take 1 tablet (5 mg total) by mouth once daily.    ramipriL (ALTACE) 10 MG capsule Take 1 capsule (10 mg total) by mouth once daily.          While in the hospital, will manage blood pressure as follows; Continue home antihypertensive regimen    Will utilize p.r.n. blood pressure medication only if patient's blood pressure greater than  180/110 and she develops symptoms such as worsening chest pain or shortness of breath.

## 2022-10-20 NOTE — ASSESSMENT & PLAN NOTE
-S/p colporrhaphy combined anteroposterior with enterocele repair with Dr. Keenan 10/20  - p.r.n. pain medication per surgery recommendations   -p.r.n. antiemetics   -aggressive IS   -monitor hemoglobin hematocrit closely      Sent pt mychart, per last encounter 3/30/17, pt was to have lab drawn for TSH  Margy Pablo RN

## 2022-10-20 NOTE — HPI
Yue Chatman is a 77 y.o. y.o. female with a PMHx of hypertension, obesity, pacemaker, glucose intolerance who is s/p  colporrhaphy combined anteroposterior with enterocele repair with Dr. Keenan 10/20.  Patient was seen by Dr. Keenan clinic on 10/11.  Postoperatively, patient reports mild pain and nausea.  She denies chest pain, shortness a breath, cough and vomiting.  She reports compliance with Bactrim prior to surgery as previously discussed with Dr. Keenan. Reviewed preop labs. Hospital Medicine was consulted for further workup and management of the patient.

## 2022-10-21 LAB
BASOPHILS # BLD AUTO: 0.01 K/UL (ref 0–0.2)
BASOPHILS NFR BLD: 0.1 % (ref 0–1.9)
DIFFERENTIAL METHOD: ABNORMAL
EOSINOPHIL # BLD AUTO: 0 K/UL (ref 0–0.5)
EOSINOPHIL NFR BLD: 0.1 % (ref 0–8)
ERYTHROCYTE [DISTWIDTH] IN BLOOD BY AUTOMATED COUNT: 14.5 % (ref 11.5–14.5)
HCT VFR BLD AUTO: 39.5 % (ref 37–48.5)
HGB BLD-MCNC: 12.8 G/DL (ref 12–16)
IMM GRANULOCYTES # BLD AUTO: 0.02 K/UL (ref 0–0.04)
IMM GRANULOCYTES NFR BLD AUTO: 0.2 % (ref 0–0.5)
LYMPHOCYTES # BLD AUTO: 1.6 K/UL (ref 1–4.8)
LYMPHOCYTES NFR BLD: 14.2 % (ref 18–48)
MCH RBC QN AUTO: 27.9 PG (ref 27–31)
MCHC RBC AUTO-ENTMCNC: 32.4 G/DL (ref 32–36)
MCV RBC AUTO: 86 FL (ref 82–98)
MONOCYTES # BLD AUTO: 0.7 K/UL (ref 0.3–1)
MONOCYTES NFR BLD: 6.4 % (ref 4–15)
NEUTROPHILS # BLD AUTO: 8.8 K/UL (ref 1.8–7.7)
NEUTROPHILS NFR BLD: 79 % (ref 38–73)
NRBC BLD-RTO: 0 /100 WBC
PLATELET # BLD AUTO: 153 K/UL (ref 150–450)
PMV BLD AUTO: 12.5 FL (ref 9.2–12.9)
RBC # BLD AUTO: 4.59 M/UL (ref 4–5.4)
WBC # BLD AUTO: 11.1 K/UL (ref 3.9–12.7)

## 2022-10-21 PROCEDURE — 36415 COLL VENOUS BLD VENIPUNCTURE: CPT

## 2022-10-21 PROCEDURE — 94761 N-INVAS EAR/PLS OXIMETRY MLT: CPT

## 2022-10-21 PROCEDURE — 25000003 PHARM REV CODE 250: Performed by: NURSE PRACTITIONER

## 2022-10-21 PROCEDURE — 94799 UNLISTED PULMONARY SVC/PX: CPT

## 2022-10-21 PROCEDURE — 85025 COMPLETE CBC W/AUTO DIFF WBC: CPT

## 2022-10-21 PROCEDURE — 63600175 PHARM REV CODE 636 W HCPCS: Performed by: NURSE PRACTITIONER

## 2022-10-21 PROCEDURE — 25000003 PHARM REV CODE 250: Performed by: OBSTETRICS & GYNECOLOGY

## 2022-10-21 RX ORDER — ONDANSETRON 4 MG/1
4 TABLET, ORALLY DISINTEGRATING ORAL EVERY 6 HOURS PRN
Qty: 16 TABLET | Refills: 0 | Status: SHIPPED | OUTPATIENT
Start: 2022-10-21 | End: 2022-10-25

## 2022-10-21 RX ORDER — ACETAMINOPHEN 500 MG
1000 TABLET ORAL EVERY 6 HOURS PRN
Status: DISCONTINUED | OUTPATIENT
Start: 2022-10-21 | End: 2022-10-21 | Stop reason: HOSPADM

## 2022-10-21 RX ORDER — KETOROLAC TROMETHAMINE 10 MG/1
10 TABLET, FILM COATED ORAL EVERY 6 HOURS
Qty: 16 TABLET | Refills: 0 | Status: SHIPPED | OUTPATIENT
Start: 2022-10-21 | End: 2022-10-25

## 2022-10-21 RX ORDER — HYDROCODONE BITARTRATE AND ACETAMINOPHEN 5; 325 MG/1; MG/1
1 TABLET ORAL EVERY 6 HOURS PRN
Status: DISCONTINUED | OUTPATIENT
Start: 2022-10-21 | End: 2022-10-21 | Stop reason: HOSPADM

## 2022-10-21 RX ADMIN — AMLODIPINE BESYLATE 5 MG: 5 TABLET ORAL at 08:10

## 2022-10-21 RX ADMIN — DOCUSATE SODIUM 100 MG: 100 CAPSULE, LIQUID FILLED ORAL at 08:10

## 2022-10-21 RX ADMIN — ACETAMINOPHEN 1000 MG: 500 TABLET ORAL at 11:10

## 2022-10-21 RX ADMIN — MUPIROCIN: 20 OINTMENT TOPICAL at 08:10

## 2022-10-21 RX ADMIN — ONDANSETRON 8 MG: 8 TABLET, ORALLY DISINTEGRATING ORAL at 10:10

## 2022-10-21 RX ADMIN — LISINOPRIL 10 MG: 2.5 TABLET ORAL at 08:10

## 2022-10-21 RX ADMIN — IBUPROFEN 600 MG: 600 TABLET ORAL at 08:10

## 2022-10-21 NOTE — PLAN OF CARE
Plan of care reviewed with patient. Patient verbalized complete understanding. Pt awake, alert, and oriented. Pt complaining of pain, meds given. Pt complaining of nausea, meds given. Pt on tele monitoring. Iv clean, dry, intact and infusing. All fall precautions maintained, bed in lowest position, locked, call light within reach. Side rails up times 2. Slip resistant socks maintained.

## 2022-10-21 NOTE — SUBJECTIVE & OBJECTIVE
Interval History:  Patient seen and examined.  No acute events overnight.  Patient does report some nausea but no vomiting.  She states her pain is well controlled.    Review of Systems   Constitutional:  Negative for fever.   Respiratory:  Negative for cough and shortness of breath.    Cardiovascular:  Negative for chest pain and palpitations.   Gastrointestinal:  Positive for nausea. Negative for abdominal pain and vomiting.   Objective:     Vital Signs (Most Recent):  Temp: 97.9 °F (36.6 °C) (10/21/22 0706)  Pulse: (!) 57 (10/21/22 0706)  Resp: 18 (10/21/22 0706)  BP: (!) 145/65 (10/21/22 0706)  SpO2: 96 % (10/21/22 0706)   Vital Signs (24h Range):  Temp:  [97.3 °F (36.3 °C)-98.2 °F (36.8 °C)] 97.9 °F (36.6 °C)  Pulse:  [] 57  Resp:  [12-25] 18  SpO2:  [94 %-98 %] 96 %  BP: (124-160)/(56-72) 145/65     Weight: 78 kg (172 lb)  Body mass index is 30.47 kg/m².    Intake/Output Summary (Last 24 hours) at 10/21/2022 1023  Last data filed at 10/21/2022 0556  Gross per 24 hour   Intake 2130 ml   Output 1600 ml   Net 530 ml      Physical Exam  Vitals and nursing note reviewed.   Constitutional:       General: She is not in acute distress.     Appearance: Normal appearance. She is normal weight.   HENT:      Head: Normocephalic and atraumatic.      Mouth/Throat:      Mouth: Mucous membranes are moist.      Pharynx: Oropharynx is clear.   Eyes:      Extraocular Movements: Extraocular movements intact.      Conjunctiva/sclera: Conjunctivae normal.      Pupils: Pupils are equal, round, and reactive to light.   Cardiovascular:      Rate and Rhythm: Normal rate and regular rhythm.      Pulses: Normal pulses.      Heart sounds: Normal heart sounds.   Pulmonary:      Effort: Pulmonary effort is normal.      Breath sounds: Normal breath sounds.   Abdominal:      General: Abdomen is flat. Bowel sounds are normal. There is no distension.      Palpations: Abdomen is soft.      Tenderness: There is no abdominal tenderness.  There is no guarding or rebound.   Musculoskeletal:         General: Normal range of motion.      Cervical back: Normal range of motion and neck supple.   Skin:     General: Skin is warm.      Capillary Refill: Capillary refill takes 2 to 3 seconds.   Neurological:      General: No focal deficit present.      Mental Status: She is alert and oriented to person, place, and time. Mental status is at baseline.   Psychiatric:         Mood and Affect: Mood normal.         Behavior: Behavior normal.       Significant Labs: All pertinent labs within the past 24 hours have been reviewed.  CBC:   Recent Labs   Lab 10/21/22  0754   WBC 11.10   HGB 12.8   HCT 39.5        CMP:   Recent Labs   Lab 10/20/22  0557      K 4.6      CO2 19*   *   BUN 14   CREATININE 1.2   CALCIUM 9.6   ANIONGAP 13       Significant Imaging: I have reviewed all pertinent imaging results/findings within the past 24 hours.

## 2022-10-21 NOTE — ASSESSMENT & PLAN NOTE
-S/p colporrhaphy combined anteroposterior with enterocele repair with Dr. Keenan 10/20  - p.r.n. pain medication per surgery recommendations   -p.r.n. antiemetics   -aggressive IS   -monitor hemoglobin hematocrit closely

## 2022-10-21 NOTE — DISCHARGE SUMMARY
Pt is POD 1 from COLPORRHAPHY, COMBINED ANTEROPOSTERIOR (N/A)  REPAIR, ENTEROCELE (N/A) COLPOCLEISIS (N/A) with Dr. Keenan.  Pt has been getting up to the chair and back into bed with minimal difficulty.  She has some vaginal pain that is being controlled by pain medication.  She does have some Nausea with pain medication, but she states she didn't have any nausea with the IV medication which was Toradol.  We will send Toradol along zofran as discharge medications for her.  She also thinks some of her nausea might be related to her empty stomach.  She did eat some grits for breakfast and felt that this worked well.  Her suprapubic cathter is draining clear yellow urine.  VSS.  NP reviewed catheter care and voiding trial instructions with pt and .  Both verbalized understanding.  NP reviewed postop limitations/restrictions with pt and  both verbalized understanding.  Expected discharge is later today after patient has walked.

## 2022-10-21 NOTE — CARE UPDATE
10/21/22 0815   Patient Assessment/Suction   Level of Consciousness (AVPU) alert   PRE-TX-O2   O2 Device (Oxygen Therapy) room air   SpO2 96 %   Pulse Oximetry Type Intermittent   $ Pulse Oximetry - Multiple Charge Pulse Oximetry - Multiple   Incentive Spirometer   $ Incentive Spirometer Charges done with encouragement   Administration (IS) mouthpiece utilized   Number of Repetitions (IS) 10   Level Incentive Spirometer (mL) 1000   Patient Tolerance (IS) good

## 2022-10-21 NOTE — NURSING
Discharge orders given to patient and spouse, verbalized understanding. Peripheral IV removed. Suprapubic cath intact. Educated on care. Left floor via wheelchair, transportation provided by spouse.

## 2022-10-21 NOTE — PLAN OF CARE
Patient cleared for discharge from case management standpoint.       10/21/22 1045   Final Note   Assessment Type Final Discharge Note   Anticipated Discharge Disposition Home   Hospital Resources/Appts/Education Provided Appointments scheduled and added to AVS;Provided patient/caregiver with written discharge plan information;Provided education on problems/symptoms using teachback

## 2022-10-21 NOTE — CONSULTS
[facility] Hospital Medicine  Consult Note    Patient Name: Yue Chatman  MRN: [id number]  Admission Date: 10/20/2022  Hospital Length of Stay: 0 days  Attending Physician: Geovani Keenan MD   Primary Care Provider: Jose Montgomery MD           Patient information was obtained from patient, past medical records and ER records.     Consults  Subjective:     Principal Problem: Vaginal vault prolapse    Chief Complaint: No chief complaint on file.       HPI: Yue Chatman is a 77 y.o. y.o. female with a PMHx of hypertension, obesity, pacemaker, glucose intolerance who is s/p  colporrhaphy combined anteroposterior with enterocele repair with Dr. Keenan 10/20.  Patient was seen by Dr. Keenan clinic on 10/11.  Postoperatively, patient reports mild pain and nausea.  She denies chest pain, shortness a breath, cough and vomiting.  She reports compliance with Bactrim prior to surgery as previously discussed with Dr. Keenan. Reviewed preop labs. Hospital Medicine was consulted for further workup and management of the patient.         Interval History:  Patient seen and examined.  No acute events overnight.  Patient does report some nausea but no vomiting.  She states her pain is well controlled.    Review of Systems   Constitutional:  Negative for fever.   Respiratory:  Negative for cough and shortness of breath.    Cardiovascular:  Negative for chest pain and palpitations.   Gastrointestinal:  Positive for nausea. Negative for abdominal pain and vomiting.   Objective:     Vital Signs (Most Recent):  Temp: 97.9 °F (36.6 °C) (10/21/22 0706)  Pulse: (!) 57 (10/21/22 0706)  Resp: 18 (10/21/22 0706)  BP: (!) 145/65 (10/21/22 0706)  SpO2: 96 % (10/21/22 0706)   Vital Signs (24h Range):  Temp:  [97.3 °F (36.3 °C)-98.2 °F (36.8 °C)] 97.9 °F (36.6 °C)  Pulse:  [] 57  Resp:  [12-25] 18  SpO2:  [94 %-98 %] 96 %  BP: (124-160)/(56-72) 145/65     Weight: 78 kg (172 lb)  Body mass index is 30.47  kg/m².    Intake/Output Summary (Last 24 hours) at 10/21/2022 1023  Last data filed at 10/21/2022 0556  Gross per 24 hour   Intake 2130 ml   Output 1600 ml   Net 530 ml      Physical Exam  Vitals and nursing note reviewed.   Constitutional:       General: She is not in acute distress.     Appearance: Normal appearance. She is normal weight.   HENT:      Head: Normocephalic and atraumatic.      Mouth/Throat:      Mouth: Mucous membranes are moist.      Pharynx: Oropharynx is clear.   Eyes:      Extraocular Movements: Extraocular movements intact.      Conjunctiva/sclera: Conjunctivae normal.      Pupils: Pupils are equal, round, and reactive to light.   Cardiovascular:      Rate and Rhythm: Normal rate and regular rhythm.      Pulses: Normal pulses.      Heart sounds: Normal heart sounds.   Pulmonary:      Effort: Pulmonary effort is normal.      Breath sounds: Normal breath sounds.   Abdominal:      General: Abdomen is flat. Bowel sounds are normal. There is no distension.      Palpations: Abdomen is soft.      Tenderness: There is no abdominal tenderness. There is no guarding or rebound.   Musculoskeletal:         General: Normal range of motion.      Cervical back: Normal range of motion and neck supple.   Skin:     General: Skin is warm.      Capillary Refill: Capillary refill takes 2 to 3 seconds.   Neurological:      General: No focal deficit present.      Mental Status: She is alert and oriented to person, place, and time. Mental status is at baseline.   Psychiatric:         Mood and Affect: Mood normal.         Behavior: Behavior normal.       Significant Labs: All pertinent labs within the past 24 hours have been reviewed.  CBC:   Recent Labs   Lab 10/21/22  0754   WBC 11.10   HGB 12.8   HCT 39.5        CMP:   Recent Labs   Lab 10/20/22  0557      K 4.6      CO2 19*   *   BUN 14   CREATININE 1.2   CALCIUM 9.6   ANIONGAP 13       Significant Imaging: I have reviewed all pertinent  imaging results/findings within the past 24 hours.    Assessment/Plan:     * Vaginal vault prolapse  -S/p colporrhaphy combined anteroposterior with enterocele repair with Dr. Keenan 10/20  - p.r.n. pain medication per surgery recommendations   -p.r.n. antiemetics   -aggressive IS   -monitor hemoglobin hematocrit closely       Hyperlipidemia   Patient is chronically on statin.will continue for now. Monitor clinically. Last LDL was   Lab Results   Component Value Date    LDLCALC 150.8 04/20/2021            Glucose intolerance (impaired glucose tolerance)  Noted.   - POCT glucose order  - SSI ordered      Hypertension  Chronic, controlled.  Latest blood pressure and vitals reviewed-   Temp:  [97.7 °F (36.5 °C)-98.1 °F (36.7 °C)]   Pulse:  []   Resp:  [11-25]   BP: (115-160)/(56-72)   SpO2:  [95 %-99 %] .   Home meds for hypertension were reviewed and noted below.   Hypertension Medications             amLODIPine (NORVASC) 5 MG tablet Take 1 tablet (5 mg total) by mouth once daily.    ramipriL (ALTACE) 10 MG capsule Take 1 capsule (10 mg total) by mouth once daily.          While in the hospital, will manage blood pressure as follows; Continue home antihypertensive regimen    Will utilize p.r.n. blood pressure medication only if patient's blood pressure greater than  180/110 and she develops symptoms such as worsening chest pain or shortness of breath.          VTE Risk Mitigation (From admission, onward)         Ordered     IP VTE HIGH RISK PATIENT  Once         10/20/22 1236     Place sequential compression device  Until discontinued         10/20/22 1236                  Thank you for your consult. I will sign off. Please contact us if you have any additional questions.    Toshia Hernandes PA-C  Department of Hospital Medicine   [facility]

## 2022-10-21 NOTE — NURSING
Packing discontinued, pt tolerated well. Pt had half a peripad saturated, and a moderate amount of blood on alisson pad.

## 2022-10-24 ENCOUNTER — TELEPHONE (OUTPATIENT)
Dept: UROGYNECOLOGY | Facility: CLINIC | Age: 77
End: 2022-10-24

## 2022-10-24 VITALS
TEMPERATURE: 98 F | SYSTOLIC BLOOD PRESSURE: 145 MMHG | OXYGEN SATURATION: 97 % | RESPIRATION RATE: 18 BRPM | WEIGHT: 172 LBS | BODY MASS INDEX: 30.48 KG/M2 | DIASTOLIC BLOOD PRESSURE: 65 MMHG | HEART RATE: 57 BPM | HEIGHT: 63 IN

## 2022-10-24 NOTE — TELEPHONE ENCOUNTER
Spoke with pt, she has been bladder training for 24 hrs, her pvr was 400ml after last emptying her bladder, she will continue to train for 24 hrs

## 2022-10-24 NOTE — TELEPHONE ENCOUNTER
----- Message from Corrie Covington sent at 10/24/2022  9:18 AM CDT -----  Contact: Pt  Type:  Same Day Appointment Request    Caller is requesting a same day appointment.  Caller declined first available appointment listed below.      Name of Caller:  Pt  When is the first available appointment?  11/3  Symptoms:  Catheter removal  Best Call Back Number:  [phone]    Additional Information:   Pt was told to call and schedule an appt for today to get there catheter removed pt started to please call when available Thank you

## 2022-10-28 ENCOUNTER — CLINICAL SUPPORT (OUTPATIENT)
Dept: UROGYNECOLOGY | Facility: CLINIC | Age: 77
End: 2022-10-28
Payer: MEDICARE

## 2022-10-28 ENCOUNTER — TELEPHONE (OUTPATIENT)
Dept: UROGYNECOLOGY | Facility: CLINIC | Age: 77
End: 2022-10-28
Payer: MEDICARE

## 2022-10-28 DIAGNOSIS — Z46.6 ENCOUNTER FOR REMOVAL OF URINARY CATHETER: Primary | ICD-10-CM

## 2022-10-28 PROCEDURE — 99499 UNLISTED E&M SERVICE: CPT | Mod: S$GLB,,, | Performed by: OBSTETRICS & GYNECOLOGY

## 2022-10-28 PROCEDURE — 99499 NO LOS: ICD-10-PCS | Mod: S$GLB,,, | Performed by: OBSTETRICS & GYNECOLOGY

## 2022-10-28 NOTE — PROGRESS NOTES
Arrived to floor two patient identifiers used.instructed to go to bathroom and empty bladder. Ambulated to room, catheter unclamped and drained,  30 ml obtained of residual urine. Tape removed and 4 stitches cut and pulled supra pubic catheter pulled. Redness noted to area where supra pubic was pulled and were tape was on right side. Instructed to take shower and wash area, rinse real well and dry. Do not put any lotions , ointments or powders on area. States understanding and ambulated with assist to waiting room  to .

## 2022-10-28 NOTE — TELEPHONE ENCOUNTER
Called patient and inquired why she canceled appointment to get supra pubic catheter out. States that she went this morning and the  urine was about 2/3 a cup. Asked what the reading on the bag was, states she does not have the bag hooked up to the catheter. Informed that she was not suppose to disconnect the bag from the catheter, that it was suppose to remain a closed system, and that she increases her chance for infection . Instructed to clean both ends with alcohol and reattach. Continue bladder training , when she has another voiding then open up catheter and call with results.

## 2022-11-01 ENCOUNTER — OFFICE VISIT (OUTPATIENT)
Dept: UROGYNECOLOGY | Facility: CLINIC | Age: 77
End: 2022-11-01
Payer: MEDICARE

## 2022-11-01 VITALS
BODY MASS INDEX: 30.46 KG/M2 | SYSTOLIC BLOOD PRESSURE: 128 MMHG | HEART RATE: 52 BPM | HEIGHT: 63 IN | WEIGHT: 171.94 LBS | DIASTOLIC BLOOD PRESSURE: 66 MMHG

## 2022-11-01 DIAGNOSIS — R35.0 URINARY FREQUENCY: Primary | ICD-10-CM

## 2022-11-01 DIAGNOSIS — Z09 POSTOP CHECK: ICD-10-CM

## 2022-11-01 LAB
BILIRUBIN, UA POC OHS: NEGATIVE
BLOOD, UA POC OHS: NEGATIVE
CLARITY, UA POC OHS: ABNORMAL
COLOR, UA POC OHS: ABNORMAL
GLUCOSE, UA POC OHS: NEGATIVE
KETONES, UA POC OHS: NEGATIVE
LEUKOCYTES, UA POC OHS: ABNORMAL
NITRITE, UA POC OHS: NEGATIVE
PH, UA POC OHS: 8
PROTEIN, UA POC OHS: 30
SPECIFIC GRAVITY, UA POC OHS: 1.01
UROBILINOGEN, UA POC OHS: 0.2

## 2022-11-01 PROCEDURE — 1160F PR REVIEW ALL MEDS BY PRESCRIBER/CLIN PHARMACIST DOCUMENTED: ICD-10-PCS | Mod: CPTII,S$GLB,, | Performed by: NURSE PRACTITIONER

## 2022-11-01 PROCEDURE — 99024 PR POST-OP FOLLOW-UP VISIT: ICD-10-PCS | Mod: S$GLB,,, | Performed by: NURSE PRACTITIONER

## 2022-11-01 PROCEDURE — 3074F PR MOST RECENT SYSTOLIC BLOOD PRESSURE < 130 MM HG: ICD-10-PCS | Mod: CPTII,S$GLB,, | Performed by: NURSE PRACTITIONER

## 2022-11-01 PROCEDURE — 3288F FALL RISK ASSESSMENT DOCD: CPT | Mod: CPTII,S$GLB,, | Performed by: NURSE PRACTITIONER

## 2022-11-01 PROCEDURE — 1160F RVW MEDS BY RX/DR IN RCRD: CPT | Mod: CPTII,S$GLB,, | Performed by: NURSE PRACTITIONER

## 2022-11-01 PROCEDURE — 3078F DIAST BP <80 MM HG: CPT | Mod: CPTII,S$GLB,, | Performed by: NURSE PRACTITIONER

## 2022-11-01 PROCEDURE — 99999 PR PBB SHADOW E&M-EST. PATIENT-LVL III: ICD-10-PCS | Mod: PBBFAC,,, | Performed by: NURSE PRACTITIONER

## 2022-11-01 PROCEDURE — 81003 URINALYSIS AUTO W/O SCOPE: CPT | Mod: QW,S$GLB,, | Performed by: NURSE PRACTITIONER

## 2022-11-01 PROCEDURE — 1101F PR PT FALLS ASSESS DOC 0-1 FALLS W/OUT INJ PAST YR: ICD-10-PCS | Mod: CPTII,S$GLB,, | Performed by: NURSE PRACTITIONER

## 2022-11-01 PROCEDURE — 99999 PR PBB SHADOW E&M-EST. PATIENT-LVL III: CPT | Mod: PBBFAC,,, | Performed by: NURSE PRACTITIONER

## 2022-11-01 PROCEDURE — 1159F MED LIST DOCD IN RCRD: CPT | Mod: CPTII,S$GLB,, | Performed by: NURSE PRACTITIONER

## 2022-11-01 PROCEDURE — 1159F PR MEDICATION LIST DOCUMENTED IN MEDICAL RECORD: ICD-10-PCS | Mod: CPTII,S$GLB,, | Performed by: NURSE PRACTITIONER

## 2022-11-01 PROCEDURE — 3288F PR FALLS RISK ASSESSMENT DOCUMENTED: ICD-10-PCS | Mod: CPTII,S$GLB,, | Performed by: NURSE PRACTITIONER

## 2022-11-01 PROCEDURE — 3074F SYST BP LT 130 MM HG: CPT | Mod: CPTII,S$GLB,, | Performed by: NURSE PRACTITIONER

## 2022-11-01 PROCEDURE — 87086 URINE CULTURE/COLONY COUNT: CPT | Performed by: NURSE PRACTITIONER

## 2022-11-01 PROCEDURE — 99024 POSTOP FOLLOW-UP VISIT: CPT | Mod: S$GLB,,, | Performed by: NURSE PRACTITIONER

## 2022-11-01 PROCEDURE — 81003 POCT URINALYSIS(INSTRUMENT): ICD-10-PCS | Mod: QW,S$GLB,, | Performed by: NURSE PRACTITIONER

## 2022-11-01 PROCEDURE — 1101F PT FALLS ASSESS-DOCD LE1/YR: CPT | Mod: CPTII,S$GLB,, | Performed by: NURSE PRACTITIONER

## 2022-11-01 PROCEDURE — 3078F PR MOST RECENT DIASTOLIC BLOOD PRESSURE < 80 MM HG: ICD-10-PCS | Mod: CPTII,S$GLB,, | Performed by: NURSE PRACTITIONER

## 2022-11-01 NOTE — PROGRESS NOTES
Subjective:       Patient ID: Yue Chatman is a 77 y.o. female.    Chief Complaint: postop check      Yue Chatman is a 77 y.o. female.who is 2 weeks postop from APR,enterocele repair, partial colpocleisis, suprapubic catheterization on 10/20/22 with Dr. Keenan.  She feels that she is doing fairly well.  She has some mild vaginal tenderness.  She has frequency x q 45 minutes- 1 hour during the day.  She has nocturia x 1.  She denies any PVF.  She had 1 episode of UUI in the past 2 weeks, but she had been putting off going to the restroom.  She denies any vaginal discharge or bleeding.  She is doing well with BM.  She has been taking it easy and not doing too much.  She denies any other acute complaints/concerns at this time.      Review of Systems   Constitutional:  Negative for activity change, fever and unexpected weight change.   HENT:  Negative for hearing loss.    Eyes:  Negative for visual disturbance.   Respiratory:  Negative for shortness of breath and wheezing.    Cardiovascular:  Negative for chest pain, palpitations and leg swelling.   Gastrointestinal:  Negative for abdominal pain, constipation and diarrhea.   Genitourinary:  Positive for frequency and urgency. Negative for dyspareunia, dysuria, vaginal bleeding and vaginal discharge.   Musculoskeletal:  Negative for gait problem and neck pain.   Skin:  Negative for rash and wound.   Allergic/Immunologic: Negative for immunocompromised state.   Neurological:  Negative for tremors, speech difficulty and weakness.   Hematological:  Does not bruise/bleed easily.   Psychiatric/Behavioral:  Negative for agitation and confusion.      Objective:      Physical Exam  Vitals reviewed. Exam conducted with a chaperone present.   Constitutional:       General: She is not in acute distress.     Appearance: She is well-developed.   HENT:      Head: Normocephalic and atraumatic.   Neck:      Thyroid: No thyromegaly.   Pulmonary:      Effort: Pulmonary effort is  normal. No respiratory distress.   Abdominal:      Palpations: Abdomen is soft.      Tenderness: There is no abdominal tenderness.      Hernia: No hernia is present.   Musculoskeletal:         General: Normal range of motion.      Cervical back: Normal range of motion.   Skin:     General: Skin is warm and dry.      Findings: No rash.   Neurological:      Mental Status: She is alert and oriented to person, place, and time.   Psychiatric:         Mood and Affect: Mood normal.         Behavior: Behavior normal.         Thought Content: Thought content normal.     Pelvic Exam:  Deferred at this time, postop      Assessment:       1. Urinary frequency    2. Postop check          Plan:       Urinary frequency- urine culture as noted below  -     POCT Urinalysis(Instrument)  -     Urine culture    Postop check- NP reviewed postop limitations/restrictions with pt.  Pt verbalized understanding.    RTC 4 weeks with Dr. Keenan

## 2022-11-02 LAB — BACTERIA UR CULT: NO GROWTH

## 2022-11-03 ENCOUNTER — TELEPHONE (OUTPATIENT)
Dept: UROGYNECOLOGY | Facility: CLINIC | Age: 77
End: 2022-11-03

## 2022-11-03 NOTE — TELEPHONE ENCOUNTER
----- Message from SKYLA Marshall sent at 11/3/2022  9:45 AM CDT -----  Her urine culture shows no growth.  How is she feeling?

## 2022-12-01 ENCOUNTER — OFFICE VISIT (OUTPATIENT)
Dept: FAMILY MEDICINE | Facility: CLINIC | Age: 77
End: 2022-12-01
Payer: MEDICARE

## 2022-12-01 VITALS
TEMPERATURE: 98 F | DIASTOLIC BLOOD PRESSURE: 71 MMHG | SYSTOLIC BLOOD PRESSURE: 114 MMHG | WEIGHT: 179.25 LBS | RESPIRATION RATE: 18 BRPM | BODY MASS INDEX: 31.75 KG/M2 | HEART RATE: 55 BPM | OXYGEN SATURATION: 96 %

## 2022-12-01 DIAGNOSIS — Z51.89 ENCOUNTER FOR POST-TRAUMATIC WOUND CHECK: Primary | ICD-10-CM

## 2022-12-01 PROCEDURE — 3074F SYST BP LT 130 MM HG: CPT | Mod: CPTII,S$GLB,, | Performed by: PHYSICIAN ASSISTANT

## 2022-12-01 PROCEDURE — 3074F PR MOST RECENT SYSTOLIC BLOOD PRESSURE < 130 MM HG: ICD-10-PCS | Mod: CPTII,S$GLB,, | Performed by: PHYSICIAN ASSISTANT

## 2022-12-01 PROCEDURE — 3288F FALL RISK ASSESSMENT DOCD: CPT | Mod: CPTII,S$GLB,, | Performed by: PHYSICIAN ASSISTANT

## 2022-12-01 PROCEDURE — 99212 PR OFFICE/OUTPT VISIT, EST, LEVL II, 10-19 MIN: ICD-10-PCS | Mod: S$GLB,,, | Performed by: PHYSICIAN ASSISTANT

## 2022-12-01 PROCEDURE — 3078F DIAST BP <80 MM HG: CPT | Mod: CPTII,S$GLB,, | Performed by: PHYSICIAN ASSISTANT

## 2022-12-01 PROCEDURE — 99212 OFFICE O/P EST SF 10 MIN: CPT | Mod: S$GLB,,, | Performed by: PHYSICIAN ASSISTANT

## 2022-12-01 PROCEDURE — 1159F MED LIST DOCD IN RCRD: CPT | Mod: CPTII,S$GLB,, | Performed by: PHYSICIAN ASSISTANT

## 2022-12-01 PROCEDURE — 3078F PR MOST RECENT DIASTOLIC BLOOD PRESSURE < 80 MM HG: ICD-10-PCS | Mod: CPTII,S$GLB,, | Performed by: PHYSICIAN ASSISTANT

## 2022-12-01 PROCEDURE — 3288F PR FALLS RISK ASSESSMENT DOCUMENTED: ICD-10-PCS | Mod: CPTII,S$GLB,, | Performed by: PHYSICIAN ASSISTANT

## 2022-12-01 PROCEDURE — 1159F PR MEDICATION LIST DOCUMENTED IN MEDICAL RECORD: ICD-10-PCS | Mod: CPTII,S$GLB,, | Performed by: PHYSICIAN ASSISTANT

## 2022-12-01 PROCEDURE — 1101F PR PT FALLS ASSESS DOC 0-1 FALLS W/OUT INJ PAST YR: ICD-10-PCS | Mod: CPTII,S$GLB,, | Performed by: PHYSICIAN ASSISTANT

## 2022-12-01 PROCEDURE — 1101F PT FALLS ASSESS-DOCD LE1/YR: CPT | Mod: CPTII,S$GLB,, | Performed by: PHYSICIAN ASSISTANT

## 2022-12-01 PROCEDURE — 1126F AMNT PAIN NOTED NONE PRSNT: CPT | Mod: CPTII,S$GLB,, | Performed by: PHYSICIAN ASSISTANT

## 2022-12-01 PROCEDURE — 1126F PR PAIN SEVERITY QUANTIFIED, NO PAIN PRESENT: ICD-10-PCS | Mod: CPTII,S$GLB,, | Performed by: PHYSICIAN ASSISTANT

## 2022-12-01 NOTE — PROGRESS NOTES
Subjective:       Yue Chatman is a 77 y.o. female who presents today for a dressing change.  Patient has a laceration wound which is located on the head. Pain is rated 0/10.  Was seen in the ER and sutures were placed apprx 4 days ago     Objective:      /71   Pulse (!) 55   Temp 98.1 °F (36.7 °C)   Resp 18   Wt 81.3 kg (179 lb 3.7 oz)   SpO2 96%   BMI 31.75 kg/m²     Wound:   Sutures placed. No signs of infection        Assessment:      Wound cares provided were visual inspection      Plan:      1. patient instructed to Follow up in 3 days for removal  2. Patient instructions were given.  3. Follow up: 3 days.

## 2022-12-05 ENCOUNTER — OFFICE VISIT (OUTPATIENT)
Dept: FAMILY MEDICINE | Facility: CLINIC | Age: 77
End: 2022-12-05
Payer: MEDICARE

## 2022-12-05 VITALS
HEIGHT: 63 IN | BODY MASS INDEX: 31.45 KG/M2 | HEART RATE: 64 BPM | WEIGHT: 177.5 LBS | DIASTOLIC BLOOD PRESSURE: 76 MMHG | SYSTOLIC BLOOD PRESSURE: 122 MMHG

## 2022-12-05 DIAGNOSIS — Z48.02 ENCOUNTER FOR REMOVAL OF SUTURES: Primary | ICD-10-CM

## 2022-12-05 PROCEDURE — 3074F SYST BP LT 130 MM HG: CPT | Mod: CPTII,S$GLB,, | Performed by: PHYSICIAN ASSISTANT

## 2022-12-05 PROCEDURE — 1125F AMNT PAIN NOTED PAIN PRSNT: CPT | Mod: CPTII,S$GLB,, | Performed by: PHYSICIAN ASSISTANT

## 2022-12-05 PROCEDURE — 3078F PR MOST RECENT DIASTOLIC BLOOD PRESSURE < 80 MM HG: ICD-10-PCS | Mod: CPTII,S$GLB,, | Performed by: PHYSICIAN ASSISTANT

## 2022-12-05 PROCEDURE — 99024 SUTURE REMOVAL: ICD-10-PCS | Mod: S$GLB,,, | Performed by: PHYSICIAN ASSISTANT

## 2022-12-05 PROCEDURE — 99212 PR OFFICE/OUTPT VISIT, EST, LEVL II, 10-19 MIN: ICD-10-PCS | Mod: S$GLB,,, | Performed by: PHYSICIAN ASSISTANT

## 2022-12-05 PROCEDURE — 3288F FALL RISK ASSESSMENT DOCD: CPT | Mod: CPTII,S$GLB,, | Performed by: PHYSICIAN ASSISTANT

## 2022-12-05 PROCEDURE — 3074F PR MOST RECENT SYSTOLIC BLOOD PRESSURE < 130 MM HG: ICD-10-PCS | Mod: CPTII,S$GLB,, | Performed by: PHYSICIAN ASSISTANT

## 2022-12-05 PROCEDURE — 1125F PR PAIN SEVERITY QUANTIFIED, PAIN PRESENT: ICD-10-PCS | Mod: CPTII,S$GLB,, | Performed by: PHYSICIAN ASSISTANT

## 2022-12-05 PROCEDURE — 99212 OFFICE O/P EST SF 10 MIN: CPT | Mod: S$GLB,,, | Performed by: PHYSICIAN ASSISTANT

## 2022-12-05 PROCEDURE — 1159F MED LIST DOCD IN RCRD: CPT | Mod: CPTII,S$GLB,, | Performed by: PHYSICIAN ASSISTANT

## 2022-12-05 PROCEDURE — 99024 POSTOP FOLLOW-UP VISIT: CPT | Mod: S$GLB,,, | Performed by: PHYSICIAN ASSISTANT

## 2022-12-05 PROCEDURE — 1159F PR MEDICATION LIST DOCUMENTED IN MEDICAL RECORD: ICD-10-PCS | Mod: CPTII,S$GLB,, | Performed by: PHYSICIAN ASSISTANT

## 2022-12-05 PROCEDURE — 3078F DIAST BP <80 MM HG: CPT | Mod: CPTII,S$GLB,, | Performed by: PHYSICIAN ASSISTANT

## 2022-12-05 PROCEDURE — 1100F PTFALLS ASSESS-DOCD GE2>/YR: CPT | Mod: CPTII,S$GLB,, | Performed by: PHYSICIAN ASSISTANT

## 2022-12-05 PROCEDURE — 1100F PR PT FALLS ASSESS DOC 2+ FALLS/FALL W/INJURY/YR: ICD-10-PCS | Mod: CPTII,S$GLB,, | Performed by: PHYSICIAN ASSISTANT

## 2022-12-05 PROCEDURE — 3288F PR FALLS RISK ASSESSMENT DOCUMENTED: ICD-10-PCS | Mod: CPTII,S$GLB,, | Performed by: PHYSICIAN ASSISTANT

## 2022-12-05 NOTE — PROGRESS NOTES
"Subjective:      Yue Chatman is a 77 y.o. female who obtained a laceration 7 days ago, which required closure with 8 sutures. Mechanism of injury: fall. She denies pain, redness, or drainage from the wound. Her last tetanus was  upt      The following portions of the patient's history were reviewed and updated as appropriate: allergies, current medications, past family history, past medical history, past social history, past surgical history, and problem list.    Review of Systems  Constitutional: negative for chills      Objective:      /76 (BP Location: Left arm, Patient Position: Sitting, BP Method: Large (Manual))   Pulse 64   Ht 5' 3" (1.6 m)   Wt 80.5 kg (177 lb 7.5 oz)   BMI 31.44 kg/m²   Injury exam:  A 2.5 cm laceration noted on the above left eye is healing well, without evidence of infection.      Assessment:      Laceration is healing well, without evidence of infection.      Plan:        1. 8 sutures were removed.  2. Wound care discussed.  3. Follow up as needed.   "

## 2023-04-11 PROBLEM — I70.0 ATHEROSCLEROSIS OF AORTA: Status: ACTIVE | Noted: 2023-04-11

## 2023-04-11 PROBLEM — I49.5 SICK SINUS SYNDROME: Status: ACTIVE | Noted: 2023-04-11

## 2023-04-17 PROBLEM — M80.00XD AGE-RELATED OSTEOPOROSIS WITH CURRENT PATHOLOGICAL FRACTURE WITH ROUTINE HEALING: Status: ACTIVE | Noted: 2023-04-17

## 2023-12-20 PROBLEM — E31.9 POLYGLANDULAR DYSFUNCTION: Status: RESOLVED | Noted: 2020-01-10 | Resolved: 2023-12-20

## 2024-03-11 ENCOUNTER — TELEPHONE (OUTPATIENT)
Dept: UROGYNECOLOGY | Facility: CLINIC | Age: 79
End: 2024-03-11
Payer: MEDICARE

## 2024-03-11 NOTE — TELEPHONE ENCOUNTER
----- Message from Osmar Hendrickson sent at 3/11/2024 11:43 AM CDT -----  Type:  Sooner Appointment Request    Caller is requesting a sooner appointment.  Caller declined first available appointment listed below.  Caller will not accept being placed on the waitlist and is requesting a message be sent to doctor.    Name of Caller:  pt  When is the first available appointment?  none  Symptoms:  annual check up  Would the patient rather a call back or a response via [facility]? call  Best Call Back Number:  [phone] (home)     Additional Information:  please call and advise--thank you

## 2024-04-09 ENCOUNTER — OFFICE VISIT (OUTPATIENT)
Dept: UROGYNECOLOGY | Facility: CLINIC | Age: 79
End: 2024-04-09
Payer: MEDICARE

## 2024-04-09 DIAGNOSIS — N31.8 FREQUENCY-URGENCY SYNDROME: ICD-10-CM

## 2024-04-09 DIAGNOSIS — R35.1 NOCTURIA: ICD-10-CM

## 2024-04-09 DIAGNOSIS — N81.89 PELVIC RELAXATION: Primary | ICD-10-CM

## 2024-04-09 DIAGNOSIS — N32.81 OVERACTIVE BLADDER: ICD-10-CM

## 2024-04-09 PROCEDURE — 1159F MED LIST DOCD IN RCRD: CPT | Mod: CPTII,S$GLB,, | Performed by: OBSTETRICS & GYNECOLOGY

## 2024-04-09 PROCEDURE — 1101F PT FALLS ASSESS-DOCD LE1/YR: CPT | Mod: CPTII,S$GLB,, | Performed by: OBSTETRICS & GYNECOLOGY

## 2024-04-09 PROCEDURE — 99213 OFFICE O/P EST LOW 20 MIN: CPT | Mod: S$GLB,,, | Performed by: OBSTETRICS & GYNECOLOGY

## 2024-04-09 PROCEDURE — 3288F FALL RISK ASSESSMENT DOCD: CPT | Mod: CPTII,S$GLB,, | Performed by: OBSTETRICS & GYNECOLOGY

## 2024-04-09 PROCEDURE — 99999 PR PBB SHADOW E&M-EST. PATIENT-LVL II: CPT | Mod: PBBFAC,,, | Performed by: OBSTETRICS & GYNECOLOGY

## 2024-04-09 PROCEDURE — 1126F AMNT PAIN NOTED NONE PRSNT: CPT | Mod: CPTII,S$GLB,, | Performed by: OBSTETRICS & GYNECOLOGY

## 2024-04-09 RX ORDER — MIRABEGRON 50 MG/1
1 TABLET, EXTENDED RELEASE ORAL DAILY
Qty: 30 TABLET | Refills: 11 | Status: SHIPPED | OUTPATIENT
Start: 2024-04-09 | End: 2025-04-09

## 2024-04-09 NOTE — PROGRESS NOTES
Subjective:     Chief Complaint:  Frequency  History of Present Illness: Yue Chatman is a 79 y.o. female who presents for 1) frequency urgency nocturia and urge incontinence.  She says she voids large amounts even though she goes quite often.  She wears pads which are usually wet.  She has no dysuria or other signs of infection.  She has had multiple falls including a fractured risk and shoulder injury.  She is several contributing factors potentially including diabetes drinking coffee in Cokes and electrolyte imbalances.  She had a pacemaker and questioned whether this was causing the problem but was told by her cardiologist it was not.  She apparently has not followed up with her primary care for this.  The frequent bouts of urgency or likely to contribute to her falls    Review of Systems    Constitutional:  Multiple recent falls with injury  Eyes:  Cataracts  ENT: No headaches.   Respiratory:  Nonsmoker.  Cardiovascular: Hypertension recent pacemaker implantation  Gastrointestinal:  Incisional hernia  Genitourinary:  Nephrolithiasis  Integument/Breast: Negative  Hematologic/Lymphatic:  Vitamin-D deficiency.  Takes potassium and magnesium  Musculoskeletal:  Osteoporosis  Neurological: No known disc problems. No paresthesias.  Behavioral/Psych: No history of depression.   Endocrine:  Mild diabetes  Allergy/Immune: no recent reactions     Objective:   General Exam:  General appearance: NAD.   HEENT: Theodora. EOM's intact.  Neck: Normal thyroid.   Back: No CVA tenderness.  Mild kyphosis  RESP: No SOB.  Breasts: deferred  Abdomen:  Large panniculus  Extremities:  Trace edema.  Lymphatic: noncontributory  Skin: No rashes. No lesions.  Neurologic: Intact.   Psych: Oriented.   Pelvic Exam:  V:  No lesions. No palpable nodes.   Va:No d/c bleeding or lesions.  Good support.  Shortened apical vagina because of partial colpocleisis    .Meatus:No caruncle or stenosis  Urethra: Non tender. No suburethral masses.  Cx/Cuff:  Normal   Uterus: Surgically absent.  Ad: No mass or tenderness.  Levators :Symmetrical. Normal tone. Non tender.  BL: Non tender  RV:  External hemorrhoids.  Assessment:   Frequency nocturia urge incontinence-she says it is large volumes but that is not certain.  She has several potential reasons for polyuria so we need to determine if this is a contributing factor to her overactive bladder     She needs to evaluate her falls especially for preventive purposes-should follow-up with her primary care physician.  She is already having some injuries with the falls and the nocturia and frequency during the day will likely lead to more falls    Plan:    Mirabegron trial  Voiding diary

## 2024-05-09 PROBLEM — E11.65 TYPE 2 DIABETES MELLITUS WITH HYPERGLYCEMIA, WITHOUT LONG-TERM CURRENT USE OF INSULIN: Status: ACTIVE | Noted: 2024-05-09

## 2024-05-09 PROBLEM — E11.65 TYPE 2 DIABETES MELLITUS WITH HYPERGLYCEMIA, WITHOUT LONG-TERM CURRENT USE OF INSULIN: Status: RESOLVED | Noted: 2024-05-09 | Resolved: 2024-05-09

## (undated) DEVICE — NDL SAFETY 25G X 1.5 ECLIPSE

## (undated) DEVICE — DRAPE THREE-QTR REINF 53X77IN

## (undated) DEVICE — YANKAUER OPEN TIP W/O VENT

## (undated) DEVICE — PACK LAPSCP/PELVSCPY III TIBRN

## (undated) DEVICE — SCRUB 10% POVIDONE IODINE 4OZ

## (undated) DEVICE — PAD OB HS-77 STRL PREPACK 12S

## (undated) DEVICE — GLOVE SURG ULTRA TOUCH 8

## (undated) DEVICE — ELECTRODE REM PLYHSV RETURN 9

## (undated) DEVICE — CATH BONANO SUPRAPUBIC

## (undated) DEVICE — PACK CUSTOM UNIV BASIN SLI

## (undated) DEVICE — DRAPE UINDERBUT GRAD PCH

## (undated) DEVICE — CLIPPER BLADE MOD 4406 (CAREF)

## (undated) DEVICE — GLOVE SURG ULTRA TOUCH 7

## (undated) DEVICE — SUT 2-0 ETHILON 18 FS

## (undated) DEVICE — GLOVE BIOGEL PIMICRO INDIC 7.5

## (undated) DEVICE — SET CYSTO IRRIGATION UNIV SPIK

## (undated) DEVICE — TRAY SKIN SCRUB DRY PREMIUM

## (undated) DEVICE — SPONGE DERMACEA GAUZE 4X4

## (undated) DEVICE — SUT 2-0 VICRYL / CT-1

## (undated) DEVICE — GOWN SMARTGOWN LVL4 X-LONG XL

## (undated) DEVICE — GOWN POLY REINF BRTH SLV XL

## (undated) DEVICE — BAG URINARY DRN 2000ML

## (undated) DEVICE — CATH URETHRAL 16FR RED

## (undated) DEVICE — SOL NS 1000CC

## (undated) DEVICE — GOWN POLY REINF BRTH SLV LG

## (undated) DEVICE — JELLY SURGILUBE LUBE TUBE 2OZ

## (undated) DEVICE — SET EXTENSION STERILE 30IN

## (undated) DEVICE — TOWEL OR DISP STRL BLUE 4/PK

## (undated) DEVICE — STRAP OR TABLE 5IN X 72IN

## (undated) DEVICE — SOL 9P NACL IRR PIC IL

## (undated) DEVICE — UNDERGLOVES BIOGEL PI SZ 7 LF

## (undated) DEVICE — SLEEVE SCD EXPRESS KNEE MEDIUM

## (undated) DEVICE — SOL PVP-I SCRUB 7.5% 4OZ

## (undated) DEVICE — BLADE SURG #15 CARBON STEEL

## (undated) DEVICE — SYR LUER LOCK STERILE 10ML